# Patient Record
Sex: MALE | Race: WHITE | Employment: UNEMPLOYED | URBAN - METROPOLITAN AREA
[De-identification: names, ages, dates, MRNs, and addresses within clinical notes are randomized per-mention and may not be internally consistent; named-entity substitution may affect disease eponyms.]

---
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
## 2023-06-05 ENCOUNTER — HOSPITAL ENCOUNTER (INPATIENT)
Age: 23
LOS: 4 days | Discharge: OTHER INSTITUTION | End: 2023-06-09
Attending: EMERGENCY MEDICINE | Admitting: PSYCHIATRY & NEUROLOGY
Payer: MEDICAID

## 2023-06-05 DIAGNOSIS — Z00.8 ENCOUNTER FOR PSYCHOLOGICAL EVALUATION: Primary | ICD-10-CM

## 2023-06-05 DIAGNOSIS — R45.850 HOMICIDAL IDEATION: ICD-10-CM

## 2023-06-05 DIAGNOSIS — R45.851 SUICIDAL IDEATIONS: ICD-10-CM

## 2023-06-05 PROBLEM — F25.9 SCHIZOAFFECTIVE DISORDER (HCC): Status: ACTIVE | Noted: 2023-06-05

## 2023-06-05 LAB
ALBUMIN SERPL-MCNC: 5.1 G/DL (ref 3.5–5.2)
ALP SERPL-CCNC: 106 U/L (ref 40–129)
ALT SERPL-CCNC: 12 U/L (ref 0–40)
AMORPH SED URNS QL MICRO: PRESENT
AMPHET UR QL SCN: NOT DETECTED
ANION GAP SERPL CALCULATED.3IONS-SCNC: 11 MMOL/L (ref 7–16)
APAP SERPL-MCNC: <5 MCG/ML (ref 10–30)
AST SERPL-CCNC: 18 U/L (ref 0–39)
BACTERIA URNS QL MICRO: ABNORMAL /HPF
BARBITURATES UR QL SCN: NOT DETECTED
BASOPHILS # BLD: 0.07 E9/L (ref 0–0.2)
BASOPHILS NFR BLD: 0.7 % (ref 0–2)
BENZODIAZ UR QL SCN: NOT DETECTED
BILIRUB SERPL-MCNC: 0.2 MG/DL (ref 0–1.2)
BILIRUB UR QL STRIP: NEGATIVE
BUN SERPL-MCNC: 15 MG/DL (ref 6–20)
CALCIUM SERPL-MCNC: 10.5 MG/DL (ref 8.6–10.2)
CANNABINOIDS UR QL SCN: POSITIVE
CHLORIDE SERPL-SCNC: 102 MMOL/L (ref 98–107)
CLARITY UR: ABNORMAL
CO2 SERPL-SCNC: 26 MMOL/L (ref 22–29)
COCAINE UR QL SCN: NOT DETECTED
COLOR UR: YELLOW
CREAT SERPL-MCNC: 0.9 MG/DL (ref 0.7–1.2)
DRUG SCREEN COMMENT UR-IMP: ABNORMAL
EOSINOPHIL # BLD: 0.14 E9/L (ref 0.05–0.5)
EOSINOPHIL NFR BLD: 1.4 % (ref 0–6)
ERYTHROCYTE [DISTWIDTH] IN BLOOD BY AUTOMATED COUNT: 12.1 FL (ref 11.5–15)
ETHANOLAMINE SERPL-MCNC: <10 MG/DL (ref 0–0.08)
FENTANYL SCREEN, URINE: NOT DETECTED
GLUCOSE SERPL-MCNC: 77 MG/DL (ref 74–99)
GLUCOSE UR STRIP-MCNC: NEGATIVE MG/DL
HCT VFR BLD AUTO: 39.1 % (ref 37–54)
HGB BLD-MCNC: 13.1 G/DL (ref 12.5–16.5)
HGB UR QL STRIP: NEGATIVE
IMM GRANULOCYTES # BLD: 0.04 E9/L
IMM GRANULOCYTES NFR BLD: 0.4 % (ref 0–5)
KETONES UR STRIP-MCNC: NEGATIVE MG/DL
LEUKOCYTE ESTERASE UR QL STRIP: NEGATIVE
LYMPHOCYTES # BLD: 2.09 E9/L (ref 1.5–4)
LYMPHOCYTES NFR BLD: 20.2 % (ref 20–42)
MCH RBC QN AUTO: 30.1 PG (ref 26–35)
MCHC RBC AUTO-ENTMCNC: 33.5 % (ref 32–34.5)
MCV RBC AUTO: 89.9 FL (ref 80–99.9)
METHADONE UR QL SCN: NOT DETECTED
MONOCYTES # BLD: 0.65 E9/L (ref 0.1–0.95)
MONOCYTES NFR BLD: 6.3 % (ref 2–12)
NEUTROPHILS # BLD: 7.38 E9/L (ref 1.8–7.3)
NEUTS SEG NFR BLD: 71 % (ref 43–80)
NITRITE UR QL STRIP: NEGATIVE
OPIATES UR QL SCN: NOT DETECTED
OXYCODONE URINE: NOT DETECTED
PCP UR QL SCN: NOT DETECTED
PH UR STRIP: 7.5 [PH] (ref 5–9)
PLATELET # BLD AUTO: 299 E9/L (ref 130–450)
PMV BLD AUTO: 11 FL (ref 7–12)
POTASSIUM SERPL-SCNC: 4.1 MMOL/L (ref 3.5–5)
PROT SERPL-MCNC: 7.6 G/DL (ref 6.4–8.3)
PROT UR STRIP-MCNC: NEGATIVE MG/DL
RBC # BLD AUTO: 4.35 E12/L (ref 3.8–5.8)
RBC #/AREA URNS HPF: ABNORMAL /HPF (ref 0–2)
SALICYLATES SERPL-MCNC: <0.3 MG/DL (ref 0–30)
SODIUM SERPL-SCNC: 139 MMOL/L (ref 132–146)
SP GR UR STRIP: 1.01 (ref 1–1.03)
TRICYCLIC ANTIDEPRESSANTS SCREEN SERUM: NEGATIVE NG/ML
UROBILINOGEN UR STRIP-ACNC: 0.2 E.U./DL
WBC # BLD: 10.4 E9/L (ref 4.5–11.5)
WBC #/AREA URNS HPF: ABNORMAL /HPF (ref 0–5)

## 2023-06-05 PROCEDURE — 6370000000 HC RX 637 (ALT 250 FOR IP): Performed by: EMERGENCY MEDICINE

## 2023-06-05 PROCEDURE — 80053 COMPREHEN METABOLIC PANEL: CPT

## 2023-06-05 PROCEDURE — 80143 DRUG ASSAY ACETAMINOPHEN: CPT

## 2023-06-05 PROCEDURE — 81001 URINALYSIS AUTO W/SCOPE: CPT

## 2023-06-05 PROCEDURE — 85025 COMPLETE CBC W/AUTO DIFF WBC: CPT

## 2023-06-05 PROCEDURE — 82077 ASSAY SPEC XCP UR&BREATH IA: CPT

## 2023-06-05 PROCEDURE — 36415 COLL VENOUS BLD VENIPUNCTURE: CPT

## 2023-06-05 PROCEDURE — 80179 DRUG ASSAY SALICYLATE: CPT

## 2023-06-05 PROCEDURE — 80307 DRUG TEST PRSMV CHEM ANLYZR: CPT

## 2023-06-05 PROCEDURE — 99285 EMERGENCY DEPT VISIT HI MDM: CPT

## 2023-06-05 PROCEDURE — 1240000000 HC EMOTIONAL WELLNESS R&B

## 2023-06-05 RX ORDER — NICOTINE 21 MG/24HR
1 PATCH, TRANSDERMAL 24 HOURS TRANSDERMAL ONCE
Status: COMPLETED | OUTPATIENT
Start: 2023-06-05 | End: 2023-06-06

## 2023-06-05 ASSESSMENT — ENCOUNTER SYMPTOMS
DIARRHEA: 0
ABDOMINAL PAIN: 0
EYE DISCHARGE: 0
SORE THROAT: 0
COUGH: 0
EYE PAIN: 0
NAUSEA: 0
BACK PAIN: 0
SINUS PRESSURE: 0
WHEEZING: 0
VOMITING: 0
EYE REDNESS: 0
SHORTNESS OF BREATH: 0

## 2023-06-05 ASSESSMENT — LIFESTYLE VARIABLES
HOW OFTEN DO YOU HAVE A DRINK CONTAINING ALCOHOL: NEVER
HOW MANY STANDARD DRINKS CONTAINING ALCOHOL DO YOU HAVE ON A TYPICAL DAY: PATIENT DOES NOT DRINK

## 2023-06-05 ASSESSMENT — PAIN - FUNCTIONAL ASSESSMENT: PAIN_FUNCTIONAL_ASSESSMENT: NONE - DENIES PAIN

## 2023-06-05 NOTE — ED PROVIDER NOTES
There is no guarding or rebound. Musculoskeletal:         General: No tenderness or deformity. Cervical back: Normal range of motion and neck supple. Skin:     General: Skin is warm and dry. Neurological:      General: No focal deficit present. Mental Status: He is alert and oriented to person, place, and time. Psychiatric:         Mood and Affect: Mood normal.         Thought Content: Thought content normal.        Procedures        Diagnostic results    LABS:    Labs Reviewed   CBC WITH AUTO DIFFERENTIAL - Abnormal; Notable for the following components:       Result Value    Neutrophils Absolute 7.38 (*)     All other components within normal limits   COMPREHENSIVE METABOLIC PANEL - Abnormal; Notable for the following components:    Calcium 10.5 (*)     All other components within normal limits   SERUM DRUG SCREEN - Abnormal; Notable for the following components:    Acetaminophen Level <5.0 (*)     All other components within normal limits   URINALYSIS WITH MICROSCOPIC - Abnormal; Notable for the following components:    Clarity, UA CLOUDY (*)     Bacteria, UA FEW (*)     All other components within normal limits   URINE DRUG SCREEN - Abnormal; Notable for the following components:    Cannabinoid Scrn, Ur POSITIVE (*)     All other components within normal limits       As interpreted by me, the above displayed labs are abnormal. All other labs obtained during this visit were within normal range or not returned as of this dictation. EKG Interpretation  Interpreted by emergency department physician, Josef Cordero MD          RADIOLOGY:   Non-plain film images such as CT, Ultrasound and MRI are read by the radiologist. Plain radiographic images are visualized and preliminarily interpreted by the ED Provider with the below findings:    Interpretation per the Radiologist below, if available at the time of this note:    No orders to display     No results found.     No results

## 2023-06-06 PROBLEM — F12.10 CANNABIS ABUSE: Status: ACTIVE | Noted: 2023-06-06

## 2023-06-06 PROBLEM — F31.2 SEVERE MANIC BIPOLAR 1 DISORDER WITH PSYCHOTIC BEHAVIOR (HCC): Status: ACTIVE | Noted: 2023-06-06

## 2023-06-06 PROBLEM — F79 INTELLECTUAL DISABILITY: Status: ACTIVE | Noted: 2023-06-06

## 2023-06-06 PROBLEM — F25.9 SCHIZOAFFECTIVE DISORDER (HCC): Status: RESOLVED | Noted: 2023-06-05 | Resolved: 2023-06-06

## 2023-06-06 PROBLEM — F60.89 CLUSTER B PERSONALITY DISORDER (HCC): Status: ACTIVE | Noted: 2023-06-06

## 2023-06-06 PROCEDURE — 1240000000 HC EMOTIONAL WELLNESS R&B

## 2023-06-06 PROCEDURE — 6370000000 HC RX 637 (ALT 250 FOR IP): Performed by: NURSE PRACTITIONER

## 2023-06-06 RX ORDER — HALOPERIDOL 5 MG/ML
5 INJECTION INTRAMUSCULAR EVERY 6 HOURS PRN
Status: DISCONTINUED | OUTPATIENT
Start: 2023-06-06 | End: 2023-06-09 | Stop reason: HOSPADM

## 2023-06-06 RX ORDER — MAGNESIUM HYDROXIDE/ALUMINUM HYDROXICE/SIMETHICONE 120; 1200; 1200 MG/30ML; MG/30ML; MG/30ML
30 SUSPENSION ORAL PRN
Status: DISCONTINUED | OUTPATIENT
Start: 2023-06-06 | End: 2023-06-09 | Stop reason: HOSPADM

## 2023-06-06 RX ORDER — CHLORDIAZEPOXIDE HYDROCHLORIDE 25 MG/1
25 CAPSULE, GELATIN COATED ORAL EVERY 6 HOURS PRN
Status: DISCONTINUED | OUTPATIENT
Start: 2023-06-06 | End: 2023-06-09 | Stop reason: HOSPADM

## 2023-06-06 RX ORDER — NICOTINE 21 MG/24HR
1 PATCH, TRANSDERMAL 24 HOURS TRANSDERMAL DAILY
Status: DISCONTINUED | OUTPATIENT
Start: 2023-06-06 | End: 2023-06-09 | Stop reason: HOSPADM

## 2023-06-06 RX ORDER — ACETAMINOPHEN 325 MG/1
650 TABLET ORAL EVERY 6 HOURS PRN
Status: DISCONTINUED | OUTPATIENT
Start: 2023-06-06 | End: 2023-06-09 | Stop reason: HOSPADM

## 2023-06-06 RX ORDER — RISPERIDONE 1 MG/1
1 TABLET ORAL 2 TIMES DAILY
Status: DISCONTINUED | OUTPATIENT
Start: 2023-06-06 | End: 2023-06-08

## 2023-06-06 RX ORDER — HYDROXYZINE PAMOATE 25 MG/1
50 CAPSULE ORAL 3 TIMES DAILY PRN
Status: DISCONTINUED | OUTPATIENT
Start: 2023-06-06 | End: 2023-06-09 | Stop reason: HOSPADM

## 2023-06-06 RX ORDER — HALOPERIDOL 5 MG/1
5 TABLET ORAL EVERY 6 HOURS PRN
Status: DISCONTINUED | OUTPATIENT
Start: 2023-06-06 | End: 2023-06-09 | Stop reason: HOSPADM

## 2023-06-06 RX ORDER — DIVALPROEX SODIUM 250 MG/1
250 TABLET, DELAYED RELEASE ORAL EVERY 12 HOURS SCHEDULED
Status: DISCONTINUED | OUTPATIENT
Start: 2023-06-06 | End: 2023-06-09 | Stop reason: HOSPADM

## 2023-06-06 RX ORDER — LANOLIN ALCOHOL/MO/W.PET/CERES
3 CREAM (GRAM) TOPICAL NIGHTLY
Status: DISCONTINUED | OUTPATIENT
Start: 2023-06-06 | End: 2023-06-09 | Stop reason: HOSPADM

## 2023-06-06 RX ADMIN — RISPERIDONE 1 MG: 1 TABLET ORAL at 14:12

## 2023-06-06 RX ADMIN — DIVALPROEX SODIUM 250 MG: 250 TABLET, DELAYED RELEASE ORAL at 21:49

## 2023-06-06 RX ADMIN — RISPERIDONE 1 MG: 1 TABLET ORAL at 21:50

## 2023-06-06 RX ADMIN — MELATONIN 3 MG ORAL TABLET 3 MG: 3 TABLET ORAL at 21:50

## 2023-06-06 ASSESSMENT — SLEEP AND FATIGUE QUESTIONNAIRES
DO YOU USE A SLEEP AID: YES
AVERAGE NUMBER OF SLEEP HOURS: 7
AVERAGE NUMBER OF SLEEP HOURS: 8
SLEEP PATTERN: DIFFICULTY FALLING ASLEEP;DISTURBED/INTERRUPTED SLEEP
DO YOU HAVE DIFFICULTY SLEEPING: COMMENT
SLEEP PATTERN: DIFFICULTY FALLING ASLEEP;DISTURBED/INTERRUPTED SLEEP
DO YOU USE A SLEEP AID: YES
DO YOU HAVE DIFFICULTY SLEEPING: YES

## 2023-06-06 ASSESSMENT — PATIENT HEALTH QUESTIONNAIRE - PHQ9: SUM OF ALL RESPONSES TO PHQ QUESTIONS 1-9: 9

## 2023-06-06 ASSESSMENT — LIFESTYLE VARIABLES
HOW OFTEN DO YOU HAVE A DRINK CONTAINING ALCOHOL: NEVER
HOW MANY STANDARD DRINKS CONTAINING ALCOHOL DO YOU HAVE ON A TYPICAL DAY: PATIENT DOES NOT DRINK
HOW MANY STANDARD DRINKS CONTAINING ALCOHOL DO YOU HAVE ON A TYPICAL DAY: PATIENT DOES NOT DRINK
HOW OFTEN DO YOU HAVE A DRINK CONTAINING ALCOHOL: NEVER

## 2023-06-06 ASSESSMENT — PAIN SCALES - GENERAL: PAINLEVEL_OUTOF10: 0

## 2023-06-06 NOTE — H&P
Department of Psychiatry  History and Physical - Adult           Patient personally seen and examined by me and mental status exam performed. I agree the below assessment by the medical student. Psychomotor evaluation revealed no agitation retardation any abnormal movements. His eye contact is fair his speech is pressured and rapid. His mood is \"I feel okay. \"  Affect is a little did. His thought process is linear without flight of ideas loose associations. He admits to auditory and visual hallucinations states he sees shadows states hears voices \"all the time. \"  States the voices are inside of his head. He denies suicidal homicidal ideations intent or plan. He is able to repeat words and phrases, his vocabulary is intact he has knowledge of current events and past events recent remote memory are intact   impulse control is adequate cognitive function peers to be his baseline his insight judgment is fair he is alert oriented time place and person        CHIEF COMPLAINT:  \" I want to get my life together\"    Patient was seen after discussing with the treatment team and reviewing the chart    CIRCUMSTANCES OF ADMISSION:   Patient stated he was suicidal and homicidal, was pink slipped. HISTORY OF PRESENT ILLNESS:      The patient is a 25 y.o. unemployed, single, homeless, high school drop out, with no children male with significant past history of Schizophrenia, Opositional Defiant Disorder, ADHD with most recent treatment back in 2021 with Respirodol, Focalin and Prozac with several pervious psych hospitalizations at [address] in [address] [place] [address] 7 in [place]. He presented to the ED due to increased SI and HI. He was stabilized medically and admitted to  for further stabilization and evaluation. UDS was positive for marijuana and QTc is 385. Patient states that his mood is okay today. He states that he has not been in a good mindset recently.  Stating that he got kicked out of the trailer
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
## 2023-06-06 NOTE — ED NOTES
Nurse to nurse given to [facility] on [facility].      Vinh Bansal RN  06/06/23 3814
Nurse to nurse report given to [facility] RN.      Katarzyna Carmona, JONATAN  06/05/23 9969
Patient has been accepted for admission to [facility] by Laura Chowdhury NP under Dr. Nathanael Willoughby. Patient will go to room 7520B. Called admitting and notified [facility]. NUNU RN is aware to call nurse to nurse and put in for patient transport.      URSULA Ayala, [place]  06/05/23 1459
Patient has been talkative and dramatic.      Maye Davison RN  06/05/23 2304
Presented patient to [facility], NP, accepted to be admitted to [address] [place] [address].      Angi Self, RN  06/05/23 5726
Pt has a pillow case full of clothes, tote bag in locker 27 and another 2 bags in locker 26.
happened? 2. Have you ever had an order of protection taken out against you? []  Yes [x]  No  3. Have you ever been arrested due to violence? []  Yes [x]  No  4. Have you ever been cruel to animals? []  Yes [x]  No    After consideration of C-SSRS screening results, C-SSRS assessments, and this professional's assessment the patient's overall suicide risk assessed to be:  [] No Risk  [x] Low   [] Moderate   [] High     [x] Discussed current suicide risk, protective and risk factors with RN and ED Physician. Consulted with ED Physician.  Disposition/level of care recommended at this time:  [] Home:   [] Outpatient Provider:   [] Crisis Unit:   [x] Inpatient Psychiatric Unit:  [] Other:                    Jacek Box  06/05/23 3268

## 2023-06-06 NOTE — GROUP NOTE
Group Therapy Note    Date: 6/6/2023    Group Start Time: 8759  Group End Time: 7685  Group Topic: Psychoeducation    [facility] AC[facility] BH 2    [place], [place]                                                                        Group Therapy Note    Date: 6/6/2023  Start Time: 6874  End Time:  1050  Number of Participants: 14    Type of Group: Psychoeducation    Wellness Binder Information  Module Name:  Take Time for Yourself    Patient's Goal:  ID ways to improve self-care through self-reflection. Encourage discussion amongst peers      Notes:  Patient actively engaged in discussion of self-care and self-reflection. Patient accepted handout on topic, and engaged in self-reflection. Status After Intervention:  Unchanged    Participation Level:  Active Listener and Interactive    Participation Quality: Appropriate, Attentive, Sharing, and Supportive      Speech:  normal      Thought Process/Content: Logical      Affective Functioning: Congruent      Mood: euthymic      Level of consciousness:  Alert and Attentive      Response to Learning: Able to verbalize current knowledge/experience and Able to verbalize/acknowledge new learning      Endings: None Reported    Modes of Intervention: Education      Discipline Responsible: Psychoeducational Specialist      Signature:  Radha [facility] [place]

## 2023-06-06 NOTE — CARE COORDINATION
SW contacted the [facility]  and was advised pt is not in their system so he is not on any restrictions.

## 2023-06-07 LAB
CHOLESTEROL, TOTAL: 136 MG/DL (ref 0–199)
HBA1C MFR BLD: 5.4 % (ref 4–5.6)
HDLC SERPL-MCNC: 56 MG/DL
LDLC SERPL CALC-MCNC: 68 MG/DL (ref 0–99)
TRIGL SERPL-MCNC: 59 MG/DL (ref 0–149)
VLDLC SERPL CALC-MCNC: 12 MG/DL

## 2023-06-07 PROCEDURE — 80061 LIPID PANEL: CPT

## 2023-06-07 PROCEDURE — 83036 HEMOGLOBIN GLYCOSYLATED A1C: CPT

## 2023-06-07 PROCEDURE — 6370000000 HC RX 637 (ALT 250 FOR IP): Performed by: NURSE PRACTITIONER

## 2023-06-07 PROCEDURE — 1240000000 HC EMOTIONAL WELLNESS R&B

## 2023-06-07 PROCEDURE — 36415 COLL VENOUS BLD VENIPUNCTURE: CPT

## 2023-06-07 RX ADMIN — RISPERIDONE 1 MG: 1 TABLET ORAL at 21:16

## 2023-06-07 RX ADMIN — RISPERIDONE 1 MG: 1 TABLET ORAL at 09:00

## 2023-06-07 RX ADMIN — MELATONIN 3 MG ORAL TABLET 3 MG: 3 TABLET ORAL at 21:17

## 2023-06-07 RX ADMIN — DIVALPROEX SODIUM 250 MG: 250 TABLET, DELAYED RELEASE ORAL at 21:17

## 2023-06-07 RX ADMIN — DIVALPROEX SODIUM 250 MG: 250 TABLET, DELAYED RELEASE ORAL at 09:00

## 2023-06-07 ASSESSMENT — PAIN SCALES - GENERAL: PAINLEVEL_OUTOF10: 0

## 2023-06-07 NOTE — GROUP NOTE
Group Therapy Note    Date: 6/7/2023    Group Start Time: 7967  Group End Time: 1055  Group Topic: Psychoeducation    [facility] AC[facility]  [address] [place], [place]                                                                        Group Therapy Note    Date: 6/7/2023    Type of Group: Psychoeducation    Wellness Binder Information  Module Name:  self esteem      Patient's Goal:  Patient will be able to id what he or she can do to increase their own positive thoughts and feelings thru the day. Notes:  pleasant and sharing in group. Able to to participate and be supportive to others. Status After Intervention:  Improved    Participation Level:  Active Listener and Interactive    Participation Quality: Appropriate, Attentive, Sharing, and Supportive      Speech:  normal      Thought Process/Content: Logical      Affective Functioning: Congruent      Mood: euthymic      Level of consciousness:  Alert, Oriented x4, and Attentive      Response to Learning: Able to verbalize/acknowledge new learning, Able to retain information, and Progressing to goal      Endings: None Reported    Modes of Intervention: Education, Support, Socialization, Exploration, and Problem-solving      Discipline Responsible: Psychoeducational Specialist      Signature:  Caitlyn Tellez

## 2023-06-07 NOTE — GROUP NOTE
Group Therapy Note    Date: 6/7/2023    Group Start Time: 1100  Group End Time: 1140  Group Topic: Psychotherapy    [facility]  [address], MSW, W        Group Therapy Note    Attendees: 7       Patient's Goal:  To increase socialization and improve interpersonal relationships. Notes:  Pt was an active participant in group discussion. Status After Intervention:  Improved    Participation Level: Active Listener and Interactive    Participation Quality: Appropriate, Attentive, Sharing, and Supportive      Speech:  normal      Thought Process/Content: Logical  Linear      Affective Functioning: Congruent      Mood: depressed      Level of consciousness:  Alert, Oriented x4, and Attentive      Response to Learning: Able to verbalize current knowledge/experience, Able to verbalize/acknowledge new learning, Able to retain information, Capable of insight, and Progressing to goal      Endings: None Reported    Modes of Intervention: Support, Socialization, and Exploration      Discipline Responsible: /Counselor      Signature:   URSULA Luther, [facility]

## 2023-06-07 NOTE — CARE COORDINATION
Pt was seen during treatment team. Pt reports feeling alright today, denied SI/HI/AVH. Pt stated the medications are ok and he is feeling a little better. Pt will go to the [facility] at time of discharge. Pt cooperative, pleasant, with good eye contact, clear speech, improving insight/judgement.

## 2023-06-08 PROCEDURE — 6370000000 HC RX 637 (ALT 250 FOR IP): Performed by: NURSE PRACTITIONER

## 2023-06-08 PROCEDURE — 1240000000 HC EMOTIONAL WELLNESS R&B

## 2023-06-08 RX ORDER — RISPERIDONE 1 MG/1
1 TABLET ORAL DAILY
Status: DISCONTINUED | OUTPATIENT
Start: 2023-06-09 | End: 2023-06-09 | Stop reason: HOSPADM

## 2023-06-08 RX ORDER — RISPERIDONE 2 MG/1
2 TABLET, ORALLY DISINTEGRATING ORAL NIGHTLY
Status: DISCONTINUED | OUTPATIENT
Start: 2023-06-08 | End: 2023-06-09

## 2023-06-08 RX ADMIN — RISPERIDONE 1 MG: 1 TABLET ORAL at 09:14

## 2023-06-08 RX ADMIN — MELATONIN 3 MG ORAL TABLET 3 MG: 3 TABLET ORAL at 21:03

## 2023-06-08 RX ADMIN — DIVALPROEX SODIUM 250 MG: 250 TABLET, DELAYED RELEASE ORAL at 09:14

## 2023-06-08 RX ADMIN — DIVALPROEX SODIUM 250 MG: 250 TABLET, DELAYED RELEASE ORAL at 21:03

## 2023-06-08 RX ADMIN — RISPERIDONE 2 MG: 2 TABLET, ORALLY DISINTEGRATING ORAL at 21:03

## 2023-06-08 ASSESSMENT — PAIN SCALES - GENERAL: PAINLEVEL_OUTOF10: 0

## 2023-06-08 NOTE — GROUP NOTE
Group Therapy Note    Date: 6/8/2023    Group Start Time: 1100  Group End Time: 1140  Group Topic: Psychotherapy    [facility]  [address], MSW, LSW        Group Therapy Note    Attendees: 6       Patient's Goal:  To increase socialization and improve interpersonal relationships. Notes:  Pt was an active participant in group discussion. Status After Intervention:  Improved    Participation Level: Active Listener, Interactive, and Monopolizing    Participation Quality: Appropriate, Attentive, Sharing, and Supportive      Speech:  normal      Thought Process/Content: Logical  Linear      Affective Functioning: Congruent      Mood: euthymic      Level of consciousness:  Alert, Oriented x4, and Attentive      Response to Learning: Able to verbalize current knowledge/experience, Able to verbalize/acknowledge new learning, Able to retain information, Capable of insight, and Progressing to goal      Endings: None Reported    Modes of Intervention: Support, Socialization, and Exploration      Discipline Responsible: /Counselor      Signature:   URSULA Yung, [place]

## 2023-06-08 NOTE — GROUP NOTE
Group Therapy Note    Date: 6/8/2023    Group Start Time: 1625  Group End Time: 5164  Group Topic: Cognitive Skills    [facility]    Thankful URSULA Nuno, DIANNA        Group Therapy Note    Attendees: 10       Patient's Goal:  Pt attended group therapy where we discussed mindfulness meditation and the benefits associated with it. Notes:  Pt was an active participant in group therapy. Status After Intervention:  Improved    Participation Level: Active Listener and Interactive    Participation Quality: Appropriate, Attentive, and Sharing      Speech:  normal      Thought Process/Content: Logical      Affective Functioning: Congruent      Mood: euthymic      Level of consciousness:  Alert and Attentive      Response to Learning: Able to verbalize current knowledge/experience, Able to verbalize/acknowledge new learning, and Able to retain information      Endings: None Reported    Modes of Intervention: Education, Support, Socialization, Exploration, Clarifying, and Problem-solving      Discipline Responsible: /Counselor      Signature:   URSULA Vela, DIANNA

## 2023-06-08 NOTE — GROUP NOTE
Group Therapy Note    Date: 6/8/2023    Group Start Time: 0945  Group End Time: 6320  Group Topic: Cognitive Skills    [facility]    Thankful URSULA Nuno, DIANNA        Group Therapy Note    Attendees: 13       Patient's Goal:  Pt attended group therapy where we had a special guest, Claudy Velazquez, come and talk about his journey of recovery and play the guitar. Notes:  Pt was an active listener in group therapy. Pt was also was able to identify a daily goal.    Status After Intervention:  Improved    Participation Level: Active Listener and Interactive    Participation Quality: Appropriate and Attentive      Speech:  normal      Thought Process/Content: Logical      Affective Functioning: Congruent      Mood: euthymic      Level of consciousness:  Alert, Oriented x4, and Attentive      Response to Learning: Able to verbalize current knowledge/experience, Able to verbalize/acknowledge new learning, and Able to retain information      Endings: None Reported    Modes of Intervention: Education, Support, Socialization, Exploration, Clarifying, and Problem-solving      Discipline Responsible: /Counselor      Signature:   URSULA Rausch, DIANNA

## 2023-06-09 VITALS
RESPIRATION RATE: 16 BRPM | TEMPERATURE: 96.8 F | SYSTOLIC BLOOD PRESSURE: 134 MMHG | HEART RATE: 74 BPM | HEIGHT: 68 IN | WEIGHT: 135 LBS | DIASTOLIC BLOOD PRESSURE: 94 MMHG | OXYGEN SATURATION: 100 % | BODY MASS INDEX: 20.46 KG/M2

## 2023-06-09 LAB
EKG ATRIAL RATE: 54 BPM
EKG P AXIS: -29 DEGREES
EKG P-R INTERVAL: 108 MS
EKG Q-T INTERVAL: 406 MS
EKG QRS DURATION: 92 MS
EKG QTC CALCULATION (BAZETT): 385 MS
EKG R AXIS: 82 DEGREES
EKG T AXIS: 61 DEGREES
EKG VENTRICULAR RATE: 54 BPM

## 2023-06-09 PROCEDURE — 6370000000 HC RX 637 (ALT 250 FOR IP): Performed by: NURSE PRACTITIONER

## 2023-06-09 RX ORDER — RISPERIDONE 2 MG/1
2 TABLET ORAL NIGHTLY
Status: DISCONTINUED | OUTPATIENT
Start: 2023-06-09 | End: 2023-06-09 | Stop reason: HOSPADM

## 2023-06-09 RX ORDER — LANOLIN ALCOHOL/MO/W.PET/CERES
3 CREAM (GRAM) TOPICAL NIGHTLY
Qty: 30 TABLET | Refills: 0 | Status: SHIPPED | OUTPATIENT
Start: 2023-06-09 | End: 2023-07-09

## 2023-06-09 RX ORDER — RISPERIDONE 2 MG/1
2 TABLET ORAL NIGHTLY
Qty: 30 TABLET | Refills: 0 | Status: SHIPPED | OUTPATIENT
Start: 2023-06-09 | End: 2023-07-09

## 2023-06-09 RX ORDER — RISPERIDONE 1 MG/1
1 TABLET ORAL DAILY
Qty: 30 TABLET | Refills: 0 | Status: SHIPPED | OUTPATIENT
Start: 2023-06-10 | End: 2023-07-10

## 2023-06-09 RX ORDER — NICOTINE 21 MG/24HR
1 PATCH, TRANSDERMAL 24 HOURS TRANSDERMAL DAILY
Qty: 30 PATCH | Refills: 0
Start: 2023-06-10 | End: 2023-07-10

## 2023-06-09 RX ADMIN — DIVALPROEX SODIUM 250 MG: 250 TABLET, DELAYED RELEASE ORAL at 08:35

## 2023-06-09 RX ADMIN — RISPERIDONE 1 MG: 1 TABLET ORAL at 08:35

## 2023-06-09 ASSESSMENT — PAIN SCALES - GENERAL
PAINLEVEL_OUTOF10: 0
PAINLEVEL_OUTOF10: 0

## 2023-06-09 NOTE — BH NOTE
4 Eyes Skin Assessment     NAME:  Xavier Cantu  YOB: 2000  MEDICAL RECORD NUMBER:  [id number]    The patient is being assessed for  {Reason for Assessment:25322}    I agree that at least one RN has performed a thorough Head to Toe Skin Assessment on the patient. ALL assessment sites listed below have been assessed. Areas assessed by both nurses:    Head, Face, Ears, Shoulders, Back, Chest, Arms, Elbows, Hands, Sacrum. Buttock, Coccyx, Ischium, Legs. Feet and Heels, and Under Medical Devices         Does the Patient have a Wound?  No noted wound(s)       Kael Prevention initiated by RN: Yes  Wound Care Orders initiated by RN: No    Pressure Injury (Stage 3,4, Unstageable, DTI, NWPT, and Complex wounds) if present, place Wound referral order by RN under : No    New Ostomies, if present place, Ostomy referral order under : No     Nurse 1 eSignature: Electronically signed by Alice Nunez RN on 6/6/23 at 5:01 AM EDT    **SHARE this note so that the co-signing nurse can place an eSignature**    Nurse 2 eSignature: Electronically signed by Zan Baker RN on 6/6/23 at 5:20 AM EDT
Pt is out on the unit and is social with peers. He is pleasant and cooperative during interaction. He states that he has been feeling better today except for the last hour in which he states that he has been having intrusive thoughts of something happening to his daughter. He states that he saw her grown up, in a car and getting hit by a truck. He states that it is very upsetting because he feels helpless. He denies any depression, harmful ideations and hallucinations. He is voicing that he wants to get his life back in order so that he can get custody back of his daughter.
Pt resting in room with eyes closed. Q15min checks continue.
Pt states that he took:    Risperidol  Focalin  Prozac    Pt states he doesn't know the dose and that he hasn't been on his meds since 2021 and his Mom got them for him. Pt isn't sure about the pharmacy, but thinks it was [facility].
about quitting (benefits of quitting, techniques in how to quit, available resources  ( ) Referral for counseling faxed to [facility]                                                                                                                   ( X) Patient refused counseling  ( ) Patient has not smoked in the last 30 days    Metabolic Screening:    No results found for: LABA1C    No results found for: CHOL  No results found for: TRIG  No results found for: HDL  No components found for: LDLCAL  No results found for: LABVLDL      Body mass index is 20.53 kg/m².     BP Readings from Last 2 Encounters:   06/06/23 (!) 132/58   10/14/20 118/76           Pt admitted with followings belongings:       Maria L Velasco RN

## 2023-06-09 NOTE — PLAN OF CARE
[facility]  Initial Interdisciplinary Treatment Plan NOTE    Review Date & Time:  6/6/23 1000    Patient was in treatment team    Admission Type:   Admission Type: Involuntary    Reason for admission:  Reason for Admission: \"I want to get my life together. I want to become successful and start a new life in a new city. \"      Estimated Length of Stay Update:   5 DAYS  Estimated Discharge Date Update:  FRIDAY    EDUCATION:   Learner Progress Toward Treatment Goals: Reviewed results and recommendations of this team    Method: Small group    Outcome: Verbalized understanding and Needs reinforcement    PATIENT GOALS: \"MEET MY STAFF\"    PLAN/TREATMENT RECOMMENDATIONS UPDATE: SUICIDE RISK ASSESSMENTS, HOMICIDAL RISK ASSESSMENTS, SUPPORTIVE CARE, MEDICATIONS AND GROUPS, DISPOSITION ASSIST AND FOLLOW UP     GOALS UPDATE:   Time frame for Short-Term Goals:  5 DAYS    Mandi Glover RN
Patient is alert and oriented x 4. Denies pain. No noted signs or symptoms of distress. Denies SI/HI, A/V/H, or thoughts of self harm when asked. Medication compliant. Pleasant, polite, and cooperative. Brightened Affect. Appropriate with peers and staff. Appears well groomed/neat, room Is clean. Up for breakfast.  Has c/o anxiety related to a dream he had last night. Will continue to monitor for safety q 15 minute safety rounds and environmental assessments. Problem: Anxiety  Goal: Will report anxiety at manageable levels  Description: INTERVENTIONS:  1. Administer medication as ordered  2. Teach and rehearse alternative coping skills  3.  Provide emotional support with 1:1 interaction with staff  6/9/2023 0950 by Jd Gallagher RN  Outcome: Not Progressing  6/9/2023 0520 by Sohail Singletary RN  Outcome: Progressing  6/8/2023 2101 by Damaris Nuñez RN  Outcome: Progressing
Problem: Risk for Elopement  Goal: Patient will not exit the unit/facility without proper excort  6/6/2023 2138 by Ruslan Brunson RN  Outcome: Progressing  6/6/2023 1031 by Barrera Madden RN  Outcome: Progressing     Problem: Anxiety  Goal: Will report anxiety at manageable levels  Description: INTERVENTIONS:  1. Administer medication as ordered  2. Teach and rehearse alternative coping skills  3. Provide emotional support with 1:1 interaction with staff  6/6/2023 2138 by Ruslan Brunson RN  Outcome: Progressing  6/6/2023 1031 by Barrera Madden RN  Outcome: Progressing     Problem: Coping  Goal: Pt/Family able to verbalize concerns and demonstrate effective coping strategies  Description: INTERVENTIONS:  1. Assist patient/family to identify coping skills, available support systems and cultural and spiritual values  2. Provide emotional support, including active listening and acknowledgement of concerns of patient and caregivers  3. Reduce environmental stimuli, as able  4. Instruct patient/family in relaxation techniques, as appropriate  5. Assess for spiritual pain/suffering and initiate Spiritual Care, Psychosocial Clinical Specialist consults as needed  6/6/2023 2138 by Ruslan Brunson RN  Outcome: Progressing  6/6/2023 1031 by Barrera Madden RN  Outcome: Progressing     Pt denies SI, HI and AVH. Pt out on the unit. Social with peer. Brightened. Pressured speech. Medication compliant. Attends group. Will continue to monitor.
Problem: Risk for Elopement  Goal: Patient will not exit the unit/facility without proper excort  6/7/2023 1042 by Amanda Salinas RN  Outcome: Progressing  6/6/2023 2138 by Morgan Mar RN  Outcome: Progressing     Problem: Anxiety  Goal: Will report anxiety at manageable levels  Description: INTERVENTIONS:  1. Administer medication as ordered  2. Teach and rehearse alternative coping skills  3. Provide emotional support with 1:1 interaction with staff  6/7/2023 1042 by Amanda Salinas RN  Outcome: Progressing  6/6/2023 2138 by Morgan Mar RN  Outcome: Progressing     Problem: Coping  Goal: Pt/Family able to verbalize concerns and demonstrate effective coping strategies  Description: INTERVENTIONS:  1. Assist patient/family to identify coping skills, available support systems and cultural and spiritual values  2. Provide emotional support, including active listening and acknowledgement of concerns of patient and caregivers  3. Reduce environmental stimuli, as able  4. Instruct patient/family in relaxation techniques, as appropriate  5. Assess for spiritual pain/suffering and initiate Spiritual Care, Psychosocial Clinical Specialist consults as needed  6/7/2023 1042 by Amanda Salinas RN  Outcome: Progressing  6/6/2023 2138 by Morgan Mar RN  Outcome: Progressing     Problem: Decision Making  Goal: Pt/Family able to effectively weigh alternatives and participate in decision making related to treatment and care  Description: INTERVENTIONS:  1. Determine when there are differences between patient's view, family's view, and healthcare provider's view of condition  2. Facilitate patient and family articulation of goals for care  3. Help patient and family identify pros/cons of alternative solutions  4. Provide information as requested by patient/family  5. Respect patient/family right to receive or not to receive information  6. Serve as a liaison between patient and family and health care team  7.  Initiate
Problem: Risk for Elopement  Goal: Patient will not exit the unit/facility without proper excort  6/8/2023 0032 by Cait Castillo RN  Outcome: Progressing  6/7/2023 1042 by Augie Terrell RN  Outcome: Progressing     Problem: Anxiety  Goal: Will report anxiety at manageable levels  Description: INTERVENTIONS:  1. Administer medication as ordered  2. Teach and rehearse alternative coping skills  3. Provide emotional support with 1:1 interaction with staff  6/8/2023 0032 by Cait Castillo RN  Outcome: Progressing  6/7/2023 1042 by Augie Terrell RN  Outcome: Progressing     Problem: Coping  Goal: Pt/Family able to verbalize concerns and demonstrate effective coping strategies  Description: INTERVENTIONS:  1. Assist patient/family to identify coping skills, available support systems and cultural and spiritual values  2. Provide emotional support, including active listening and acknowledgement of concerns of patient and caregivers  3. Reduce environmental stimuli, as able  4. Instruct patient/family in relaxation techniques, as appropriate  5.  Assess for spiritual pain/suffering and initiate Spiritual Care, Psychosocial Clinical Specialist consults as needed  6/8/2023 0032 by Cait Castillo RN  Outcome: Progressing  6/7/2023 1042 by Augie Terrell RN  Outcome: Progressing
Problem: Risk for Elopement  Goal: Patient will not exit the unit/facility without proper excort  6/8/2023 1050 by Johanne Greenberg RN  Outcome: Progressing  6/8/2023 0426 by Cb Blake RN  Outcome: Progressing  6/8/2023 0032 by Remy Anderson RN  Outcome: Progressing     Problem: Anxiety  Goal: Will report anxiety at manageable levels  Description: INTERVENTIONS:  1. Administer medication as ordered  2. Teach and rehearse alternative coping skills  3. Provide emotional support with 1:1 interaction with staff  6/8/2023 1050 by Johanne Greenberg RN  Outcome: Progressing  6/8/2023 0426 by Cb Blake RN  Outcome: Progressing  6/8/2023 0032 by Remy Anderson RN  Outcome: Progressing     Problem: Coping  Goal: Pt/Family able to verbalize concerns and demonstrate effective coping strategies  Description: INTERVENTIONS:  1. Assist patient/family to identify coping skills, available support systems and cultural and spiritual values  2. Provide emotional support, including active listening and acknowledgement of concerns of patient and caregivers  3. Reduce environmental stimuli, as able  4. Instruct patient/family in relaxation techniques, as appropriate  5. Assess for spiritual pain/suffering and initiate Spiritual Care, Psychosocial Clinical Specialist consults as needed  6/8/2023 0426 by Cb Blake RN  Outcome: Progressing  6/8/2023 0032 by Remy Anderson RN  Outcome: Progressing     Problem: Decision Making  Goal: Pt/Family able to effectively weigh alternatives and participate in decision making related to treatment and care  Description: INTERVENTIONS:  1. Determine when there are differences between patient's view, family's view, and healthcare provider's view of condition  2. Facilitate patient and family articulation of goals for care  3. Help patient and family identify pros/cons of alternative solutions  4. Provide information as requested by patient/family  5.  Respect patient/family right to receive or
Pt resting in bed apparently asleep with easy even respirations at HS q 15 min electronic rounding.
Pt resting in bed apparently asleep with easy even respirations at HS q 15 min electronic rounding.
from Ethics, Palliative Care or initiate [facility] as is appropriate  Outcome: Progressing     Problem: Behavior  Goal: Pt/Family maintain appropriate behavior and adhere to behavioral management agreement, if implemented  Description: INTERVENTIONS:  1. Assess patient/family's coping skills and  non-compliant behavior (including use of illegal substances)  2. Notify security of behavior or suspected illegal substances which indicate the need for search of the family and/or belongings  3. Encourage verbalization of thoughts and concerns in a socially appropriate manner  4. Utilize positive, consistent limit setting strategies supporting safety of patient, staff and others  5. Encourage participation in the decision making process about the behavioral management agreement  6. If a visitor's behavior poses a threat to safety call refer to organization policy.   7. Initiate consult with , Psychosocial CNS, Spiritual Care as appropriate  Outcome: Progressing
management agreement, if implemented  Description: INTERVENTIONS:  1. Assess patient/family's coping skills and  non-compliant behavior (including use of illegal substances)  2. Notify security of behavior or suspected illegal substances which indicate the need for search of the family and/or belongings  3. Encourage verbalization of thoughts and concerns in a socially appropriate manner  4. Utilize positive, consistent limit setting strategies supporting safety of patient, staff and others  5. Encourage participation in the decision making process about the behavioral management agreement  6. If a visitor's behavior poses a threat to safety call refer to organization policy.   7. Initiate consult with , Psychosocial CNS, Spiritual Care as appropriate  Outcome: Progressing

## 2023-06-09 NOTE — GROUP NOTE
Group Therapy Note    Date: 6/9/2023    Group Start Time: 1100  Group End Time: 1140  Group Topic: Psychotherapy    [facility]    URSULA Mc LSW        Group Therapy Note    Attendees: 8       Patient's Goal:  To increase socialization and improve interpersonal relationships. Notes:  Pt was an active participant in group discussion. Status After Intervention:  Improved    Participation Level:  Active Listener and Interactive    Participation Quality: Appropriate, Attentive, Sharing, and Supportive      Speech:  loud      Thought Process/Content: Logical      Affective Functioning: Congruent      Mood: anxious      Level of consciousness:  Alert and Oriented x4      Response to Learning: Able to verbalize current knowledge/experience      Endings: None Reported    Modes of Intervention: Education, Support, and Socialization      Discipline Responsible: /Counselor      Signature:  URSULA Hogan, DIANNA

## 2023-06-09 NOTE — GROUP NOTE
Group Therapy Note    Date: 6/9/2023    Group Start Time: 1010  Group End Time: 1050  Group Topic: Psychoeducation    [facility]    [place], [place]                                                                        Group Therapy Note    Date: 6/9/2023  Type of Group: Psychoeducation    Wellness Binder Information  Module Name:  coping skills for stress management    Patient's Goal:  Patient will be able to id different types of stress management skills to help manage daily stressors. Notes:  Pleasant and sharing in group, able to id 1-3 types of skills he our she would like to try. Accepting of handout. Status After Intervention:  Improved    Participation Level:  Active Listener and Interactive    Participation Quality: Appropriate, Attentive, and Sharing      Speech:  normal      Thought Process/Content: Logical      Affective Functioning: Congruent      Mood: euthymic      Level of consciousness:  Alert, Oriented x4, and Attentive      Response to Learning: Able to verbalize/acknowledge new learning, Able to retain information, and Progressing to goal      Endings: None Reported    Modes of Intervention: Education, Support, Socialization, Exploration, and Problem-solving      Discipline Responsible: Psychoeducational Specialist      Signature:  Shanna Jarrell

## 2023-06-09 NOTE — DISCHARGE SUMMARY
personality disorder will remain a static risk factor for this patient treatment team felt the patient obtain the maximum benefit from his hospitalization he was set up with an outpatient mental health agency for outpatient follow-up services. At the time of discharge patient did not show any impulsive behavior. He was up on the unit he was attending groups and socializing with peers. He vehemently denied any suicidal homicidal ideations intent or plan. He was eating well and sleeping well there are no neurovegetative signs or symptoms of depression he denied any auditory or visual hallucinations. There are no overt or covert signs of psychosis. He was appreciative of the help that he received here. This patient no longer meets criteria for inpatient hospitalization. No AVH or paranoid thoughts  No Hopeless or worthless feeling  No active SI/HI  Appetite:  [x] Normal  [] Increased  [] Decreased    Sleep:       [x] Normal  [] Fair       [] Poor            Energy:    [x] Normal  [] Increased  [] Decreased     SI [] Present  [x] Absent  HI  []Present  [x] Absent   Aggression:  [] yes  [x] no  Patient is [x] able  [] unable to CONTRACT FOR SAFETY   Medication side effects(SE):  [x] None(Psych. Meds.) [] Other      Mental Status Examination on discharge:    Level of consciousness:  within normal limits   Appearance:  well-appearing  Behavior/Motor:  no abnormalities noted  Attitude toward examiner:  attentive and good eye contact  Speech:  spontaneous, normal rate and normal volume   Mood: \"My mood is good. \"  Affect: Appropriate and pleasant  Thought processes: Linear without flight of ideas loose associations  Thought content: Devoid of any auditory or visual hallucinations delusions or any other perceptual abnormalities.   Denies SI/HI intent or plan  Cognition:  oriented to person, place, and time   Concentration intact  Memory intact  Insight good   Judgement fair   Fund of Knowledge

## 2023-06-09 NOTE — CARE COORDINATION
ANA was advised pt medications are $13, he does not have any money, and he does not have anyone that can help pay for them. SW supervisor approved a medication voucher. Voucher was handed to RN and faxed to the pharmacy.

## 2023-06-09 NOTE — CARE COORDINATION
Pt was seen during treatment team. Pt reports feeling alright today, denied SI/HI/AVH. Pt expressed feeling ready to discharge to the [facility] today. Pt plans to start working on doing what he needs to do to get custody of his daughter. Pt stated his parents are currently caring for his daughter. Pt will continue to treat with [facility] at time of discharge. Collateral was unable to be obtained due to pt stated he has no support or contact with his family. Pt has access to help-seeking behaviors, goals&hope for the future, new outpatient mental health provider, communication skills. Pt cooperative, bright, pleasant, with good eye contact, clear speech, improved insight/judgement. SW contacted [facility]  and confirmed pt can come to their facility today as long as he has his medications in hand.

## 2023-06-09 NOTE — PROGRESS NOTES
CLINICAL PHARMACY NOTE: MEDS TO BEDS    Total # of Prescriptions Filled: 3   The following medications were delivered to the patient:  Melatonin 3 mg  Risperidone 2 mg  Risperidone 1 mg    Additional Documentation:   Delivered to JONATAN tanner
Leisure assessment complete.
Patient attended afternoon recreation activity. Patients participated in trivia with others. Able to share and answer with others. Engaged in activity. Patient was 1 of 9 in attendance.
Patient attended community meeting   Was updated on expectations of the unit, staffing, and programming  Patient shared goal for today as \"meet my staff\"
Patient attended morning community meeting. Updated on staffing assignments and daily expectations. Shared goal for the day as to go with the flow.
Patient attended morning community meeting. Updated on staffing assignments and daily expectations. Shared goal for the day as to keep up with therapy when I am discharge.
Patient attended peer recovery group from [phone]  Patient calm and cooperative, and maintained attention. Patient was an active participant in discussion, and was 1 of 9 in attendance. Patient will continue to be provided with opportunities to enhance leisure skills/interests and/or coping mechanisms.
Patient denies SI,HI, or Hallucinations at this time. States at night is when he hears voices but less from the meds. Takes his meds and attends groups. Will continue to monitor.
Patient engaged in resident choice of movie or playing cards. Patient social with peers, and exhibited overall a pleasant behavior. Patient was 1 of 13 in attendance. Patient will continue to be provided with opportunities to enhance leisure skills/interests and/or coping mechanisms.
Pt discharged with followings belongings:   Clothing: Footwear, Pants, Shorts, Socks, Hat, Shirt (1 pair of flip flops, 4 shirts, 3 pair of jeans,3 pair of shorts, 1 pair of socks 1 hat.)  Other Valuables:  (1 lighter no cigarettes.)   Valuables sent home with Wallet. Valuables retrieved from safe, Security envelope number:  [id number] and returned to patient. Patient left department with Departure Mode: By self via Mobility at Departure: Ambulatory, discharged to  . Patient education on aftercare instructions: GIVEN  Patient verbalize understanding of AVS:  YES. Given suicide prevention handout GIVEN. Discharged in stable condition. Status EXAM upon discharge:  Mental Status and Behavioral Exam  Normal: No  Level of Assistance: Independent/Self  Facial Expression: Brightened  Affect: Congruent, Stable  Level of Consciousness: Alert  Frequency of Checks: 4 times per hour, close  Mood:Normal: No  Mood: Anxious  Motor Activity:Normal: Yes  Eye Contact: Good  Observed Behavior: Cooperative, Friendly  Sexual Misconduct History: Current - no  Preception: [place] to person, [place] to time, [place] to situation, [place] to place  Attention:Normal: Yes  Attention: Distractible  Thought Processes: Unremarkable  Thought Content:Normal: Yes  Thought Content: Preoccupations  Depression Symptoms: No problems reported or observed. Anxiety Symptoms: Generalized  Opal Symptoms: No problems reported or observed.   Hallucinations: None  Delusions: No  Delusions: Jealousy  Memory:Normal: Yes  Insight and Judgment: No  Insight and Judgment: Poor insight    Dc Aceves RN
Spiritual Support Group Note    Number of Participants in Group:   14                     Time: 1    Goal: Relief from isolation and loneliness             Linette Sharing             Self-understanding and gain insight              Acceptance and belonging            Recognize they are not alone                Socialization             Empowerment       Encouragement    Topic:  [x] Spiritual Wellness and Self Care                  [x] Hope                     [] Connecting with Divine/Others        [] Thankfulness and Gratitude               [x]  Meaningfulness and Purpose               [] Forgiveness               [] Peace               [] Connect to [employer]      [] Other    Participation Level:   [x] Active Listener   [] Minimal   [] Monopolizing   [] Interactive   [] No Participation   []  Other:     Attention:   [x] Alert   [] Distractible   [] Drowsy   [] Poor   [] Other:    Manner:   [x] Cooperative   [] Suspicious   [] Withdrawn   [] Guarded   [] Irritable   [] Inhospitable   [] Other:     Others Comments from Group:
Transportation Needs: Not on file   Physical Activity: Not on file   Stress: Not on file   Social Connections: Not on file   Intimate Partner Violence: Not on file   Housing Stability: Not on file           ROS:  [x] All negative/unchanged except if checked.  Explain positive(checked items) below:  [] Constitutional  [] Eyes  [] Ear/Nose/Mouth/Throat  [] Respiratory  [] CV  [] GI  []   [] Musculoskeletal  [] Skin/Breast  [] Neurological  [] Endocrine  [] Heme/Lymph  [] Allergic/Immunologic    Explanation:     MEDICATIONS:    Current Facility-Administered Medications:     acetaminophen (TYLENOL) tablet 650 mg, 650 mg, Oral, K2K PRN, Andrea Rubalcava APRN - CNP    magnesium hydroxide (MILK OF MAGNESIA) 400 MG/5ML suspension 30 mL, 30 mL, Oral, Daily PRN, Andrea Rubalcava APRN - CNP    nicotine (NICODERM CQ) 21 MG/24HR 1 patch, 1 patch, TransDERmal, Daily, KASHMIR Cox - CNP, 1 patch at 06/07/23 0900    aluminum & magnesium hydroxide-simethicone (MAALOX) 200-200-20 MG/5ML suspension 30 mL, 30 mL, Oral, PRN, Andrea Maok, APRN - CNP    hydrOXYzine pamoate (VISTARIL) capsule 50 mg, 50 mg, Oral, TID PRN, Andrea Rubalcava, APRN - CNP    haloperidol (HALDOL) tablet 5 mg, 5 mg, Oral, Q6H PRN **OR** haloperidol lactate (HALDOL) injection 5 mg, 5 mg, IntraMUSCular, J6G PRN, Andrea Rubalcava APRN - CNP    melatonin tablet 3 mg, 3 mg, Oral, Nightly, Andrea Rubalcava APRN - CNP, 3 mg at 06/06/23 2150    chlordiazePOXIDE (LIBRIUM) capsule 25 mg, 25 mg, Oral, T8N PRN, Andrea Rubalcava APRN - CNP    divalproex (DEPAKOTE) DR tablet 250 mg, 250 mg, Oral, 2 times per day, KASHMIR Berg CNP, 083 mg at 06/07/23 0900    risperiDONE (RISPERDAL) tablet 1 mg, 1 mg, Oral, BID, KASHMIR Berg - CNP, 1 mg at 06/07/23 0900      Examination:  /67   Pulse 61   Temp 96.9 °F (36.1 °C) (Oral)   Resp 15   Ht 5' 8\" (1.727 m)   Wt 135 lb (61.2 kg)   SpO2 100%   BMI 20.53 kg/m²   Gait - steady  Medication side

## 2023-06-18 ENCOUNTER — APPOINTMENT (OUTPATIENT)
Dept: CT IMAGING | Age: 23
End: 2023-06-18
Payer: MEDICAID

## 2023-06-18 ENCOUNTER — HOSPITAL ENCOUNTER (EMERGENCY)
Age: 23
Discharge: HOME | End: 2023-06-18
Payer: MEDICAID

## 2023-06-18 VITALS
HEART RATE: 88 BPM | TEMPERATURE: 97.3 F | OXYGEN SATURATION: 98 % | SYSTOLIC BLOOD PRESSURE: 135 MMHG | DIASTOLIC BLOOD PRESSURE: 77 MMHG | WEIGHT: 135 LBS | HEIGHT: 68 IN | RESPIRATION RATE: 20 BRPM | BODY MASS INDEX: 20.46 KG/M2

## 2023-06-18 DIAGNOSIS — G44.209 TENSION HEADACHE: Primary | ICD-10-CM

## 2023-06-18 PROCEDURE — 70450 CT HEAD/BRAIN W/O DYE: CPT

## 2023-06-18 PROCEDURE — 99284 EMERGENCY DEPT VISIT MOD MDM: CPT

## 2023-06-18 RX ORDER — NAPROXEN 500 MG/1
500 TABLET ORAL 2 TIMES DAILY
Qty: 14 TABLET | Refills: 0 | Status: SHIPPED | OUTPATIENT
Start: 2023-06-18 | End: 2023-06-25

## 2023-06-18 ASSESSMENT — PAIN SCALES - GENERAL: PAINLEVEL_OUTOF10: 7

## 2023-06-18 ASSESSMENT — PAIN - FUNCTIONAL ASSESSMENT: PAIN_FUNCTIONAL_ASSESSMENT: 0-10

## 2023-08-11 ENCOUNTER — HOSPITAL ENCOUNTER (EMERGENCY)
Age: 23
Discharge: HOME | End: 2023-08-11
Payer: COMMERCIAL

## 2023-08-11 ENCOUNTER — APPOINTMENT (OUTPATIENT)
Dept: GENERAL RADIOLOGY | Age: 23
End: 2023-08-11
Payer: COMMERCIAL

## 2023-08-11 VITALS
WEIGHT: 135 LBS | OXYGEN SATURATION: 99 % | SYSTOLIC BLOOD PRESSURE: 112 MMHG | HEART RATE: 88 BPM | DIASTOLIC BLOOD PRESSURE: 88 MMHG | RESPIRATION RATE: 14 BRPM | BODY MASS INDEX: 20.53 KG/M2 | TEMPERATURE: 98.3 F

## 2023-08-11 DIAGNOSIS — S62.396A CLOSED DISPLACED FRACTURE OF OTHER PART OF FIFTH METACARPAL BONE OF RIGHT HAND, INITIAL ENCOUNTER: Primary | ICD-10-CM

## 2023-08-11 PROCEDURE — 29125 APPL SHORT ARM SPLINT STATIC: CPT

## 2023-08-11 PROCEDURE — 99283 EMERGENCY DEPT VISIT LOW MDM: CPT

## 2023-08-11 PROCEDURE — 6370000000 HC RX 637 (ALT 250 FOR IP): Performed by: NURSE PRACTITIONER

## 2023-08-11 PROCEDURE — 73130 X-RAY EXAM OF HAND: CPT

## 2023-08-11 RX ORDER — IBUPROFEN 600 MG/1
600 TABLET ORAL 3 TIMES DAILY PRN
Qty: 21 TABLET | Refills: 0 | Status: SHIPPED | OUTPATIENT
Start: 2023-08-11 | End: 2023-08-18

## 2023-08-11 RX ORDER — IBUPROFEN 600 MG/1
600 TABLET ORAL ONCE
Status: COMPLETED | OUTPATIENT
Start: 2023-08-11 | End: 2023-08-11

## 2023-08-11 RX ORDER — ACETAMINOPHEN 500 MG
1000 TABLET ORAL 4 TIMES DAILY PRN
Qty: 30 TABLET | Refills: 0 | Status: SHIPPED | OUTPATIENT
Start: 2023-08-11

## 2023-08-11 RX ADMIN — IBUPROFEN 600 MG: 600 TABLET, FILM COATED ORAL at 15:16

## 2023-08-11 ASSESSMENT — PAIN DESCRIPTION - ORIENTATION: ORIENTATION: RIGHT

## 2023-08-11 ASSESSMENT — PAIN - FUNCTIONAL ASSESSMENT: PAIN_FUNCTIONAL_ASSESSMENT: NONE - DENIES PAIN

## 2023-08-11 ASSESSMENT — PAIN DESCRIPTION - LOCATION: LOCATION: HAND

## 2023-08-11 ASSESSMENT — PAIN SCALES - GENERAL: PAINLEVEL_OUTOF10: 8

## 2023-09-06 ENCOUNTER — HOSPITAL ENCOUNTER (EMERGENCY)
Age: 23
Discharge: OTHER INSTITUTION | End: 2023-09-07
Attending: EMERGENCY MEDICINE
Payer: COMMERCIAL

## 2023-09-06 DIAGNOSIS — F39 MOOD DISORDER (HCC): Primary | ICD-10-CM

## 2023-09-06 LAB
ALBUMIN SERPL-MCNC: 5.1 G/DL (ref 3.5–5.2)
ALP SERPL-CCNC: 107 U/L (ref 40–129)
ALT SERPL-CCNC: 10 U/L (ref 0–40)
AMPHET UR QL SCN: NEGATIVE
ANION GAP SERPL CALCULATED.3IONS-SCNC: 8 MMOL/L (ref 7–16)
APAP SERPL-MCNC: <5 UG/ML (ref 10–30)
AST SERPL-CCNC: 14 U/L (ref 0–39)
BARBITURATES UR QL SCN: NEGATIVE
BASOPHILS # BLD: 0.08 K/UL (ref 0–0.2)
BASOPHILS NFR BLD: 1 % (ref 0–2)
BENZODIAZ UR QL: NEGATIVE
BILIRUB SERPL-MCNC: 0.5 MG/DL (ref 0–1.2)
BUN SERPL-MCNC: 14 MG/DL (ref 6–20)
BUPRENORPHINE UR QL: NEGATIVE
CALCIUM SERPL-MCNC: 9.4 MG/DL (ref 8.6–10.2)
CANNABINOIDS UR QL SCN: POSITIVE
CHLORIDE SERPL-SCNC: 104 MMOL/L (ref 98–107)
CO2 SERPL-SCNC: 28 MMOL/L (ref 22–29)
COCAINE UR QL SCN: NEGATIVE
CREAT SERPL-MCNC: 1.1 MG/DL (ref 0.7–1.2)
EOSINOPHIL # BLD: 0.21 K/UL (ref 0.05–0.5)
EOSINOPHILS RELATIVE PERCENT: 2 % (ref 0–6)
ERYTHROCYTE [DISTWIDTH] IN BLOOD BY AUTOMATED COUNT: 12.2 % (ref 11.5–15)
ETHANOLAMINE SERPL-MCNC: <10 MG/DL
FENTANYL UR QL: NEGATIVE
GFR SERPL CREATININE-BSD FRML MDRD: >60 ML/MIN/1.73M2
GLUCOSE SERPL-MCNC: 90 MG/DL (ref 74–99)
HCT VFR BLD AUTO: 37.6 % (ref 37–54)
HGB BLD-MCNC: 13 G/DL (ref 12.5–16.5)
IMM GRANULOCYTES # BLD AUTO: 0.04 K/UL (ref 0–0.58)
IMM GRANULOCYTES NFR BLD: 0 % (ref 0–5)
LYMPHOCYTES NFR BLD: 2.71 K/UL (ref 1.5–4)
LYMPHOCYTES RELATIVE PERCENT: 30 % (ref 20–42)
MCH RBC QN AUTO: 30.3 PG (ref 26–35)
MCHC RBC AUTO-ENTMCNC: 34.6 G/DL (ref 32–34.5)
MCV RBC AUTO: 87.6 FL (ref 80–99.9)
METHADONE UR QL: NEGATIVE
MONOCYTES NFR BLD: 0.63 K/UL (ref 0.1–0.95)
MONOCYTES NFR BLD: 7 % (ref 2–12)
NEUTROPHILS NFR BLD: 60 % (ref 43–80)
NEUTS SEG NFR BLD: 5.53 K/UL (ref 1.8–7.3)
OPIATES UR QL SCN: NEGATIVE
OXYCODONE UR QL SCN: NEGATIVE
PCP UR QL SCN: NEGATIVE
PLATELET # BLD AUTO: 250 K/UL (ref 130–450)
PMV BLD AUTO: 10.7 FL (ref 7–12)
POTASSIUM SERPL-SCNC: 4 MMOL/L (ref 3.5–5)
PROT SERPL-MCNC: 7.4 G/DL (ref 6.4–8.3)
RBC # BLD AUTO: 4.29 M/UL (ref 3.8–5.8)
SALICYLATES SERPL-MCNC: <0.3 MG/DL (ref 0–30)
SODIUM SERPL-SCNC: 140 MMOL/L (ref 132–146)
TEST INFORMATION: ABNORMAL
TOXIC TRICYCLIC SC,BLOOD: NEGATIVE
WBC OTHER # BLD: 9.2 K/UL (ref 4.5–11.5)

## 2023-09-06 PROCEDURE — 93005 ELECTROCARDIOGRAM TRACING: CPT | Performed by: EMERGENCY MEDICINE

## 2023-09-06 PROCEDURE — 80143 DRUG ASSAY ACETAMINOPHEN: CPT

## 2023-09-06 PROCEDURE — 80179 DRUG ASSAY SALICYLATE: CPT

## 2023-09-06 PROCEDURE — 85025 COMPLETE CBC W/AUTO DIFF WBC: CPT

## 2023-09-06 PROCEDURE — 80053 COMPREHEN METABOLIC PANEL: CPT

## 2023-09-06 PROCEDURE — 80307 DRUG TEST PRSMV CHEM ANLYZR: CPT

## 2023-09-06 PROCEDURE — 99284 EMERGENCY DEPT VISIT MOD MDM: CPT

## 2023-09-06 PROCEDURE — G0480 DRUG TEST DEF 1-7 CLASSES: HCPCS

## 2023-09-06 ASSESSMENT — PAIN - FUNCTIONAL ASSESSMENT: PAIN_FUNCTIONAL_ASSESSMENT: NONE - DENIES PAIN

## 2023-09-07 VITALS
TEMPERATURE: 97.7 F | RESPIRATION RATE: 16 BRPM | HEART RATE: 82 BPM | DIASTOLIC BLOOD PRESSURE: 65 MMHG | SYSTOLIC BLOOD PRESSURE: 112 MMHG | OXYGEN SATURATION: 98 %

## 2023-09-07 LAB
EKG ATRIAL RATE: 77 BPM
EKG P AXIS: -17 DEGREES
EKG P-R INTERVAL: 132 MS
EKG Q-T INTERVAL: 352 MS
EKG QRS DURATION: 88 MS
EKG QTC CALCULATION (BAZETT): 398 MS
EKG R AXIS: 85 DEGREES
EKG T AXIS: 40 DEGREES
EKG VENTRICULAR RATE: 77 BPM

## 2023-09-07 PROCEDURE — 93010 ELECTROCARDIOGRAM REPORT: CPT | Performed by: INTERNAL MEDICINE

## 2023-09-07 ASSESSMENT — PAIN DESCRIPTION - DESCRIPTORS: DESCRIPTORS: THROBBING

## 2023-09-07 ASSESSMENT — PAIN DESCRIPTION - FREQUENCY: FREQUENCY: CONTINUOUS

## 2023-09-07 ASSESSMENT — PAIN SCALES - GENERAL: PAINLEVEL_OUTOF10: 7

## 2023-09-07 ASSESSMENT — PAIN DESCRIPTION - LOCATION: LOCATION: BACK

## 2023-09-07 ASSESSMENT — PAIN - FUNCTIONAL ASSESSMENT
PAIN_FUNCTIONAL_ASSESSMENT: NONE - DENIES PAIN
PAIN_FUNCTIONAL_ASSESSMENT: 0-10

## 2023-09-07 ASSESSMENT — PAIN DESCRIPTION - ONSET: ONSET: ON-GOING

## 2023-09-07 ASSESSMENT — PAIN DESCRIPTION - PAIN TYPE: TYPE: CHRONIC PAIN

## 2023-09-07 ASSESSMENT — PAIN DESCRIPTION - ORIENTATION: ORIENTATION: LOWER
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
## 2024-08-02 ENCOUNTER — HOSPITAL ENCOUNTER (INPATIENT)
Age: 24
LOS: 3 days | Discharge: ANOTHER HOSPITAL | DRG: 885 | End: 2024-08-05
Attending: STUDENT IN AN ORGANIZED HEALTH CARE EDUCATION/TRAINING PROGRAM | Admitting: PSYCHIATRY & NEUROLOGY
Payer: COMMERCIAL

## 2024-08-02 DIAGNOSIS — F48.9 MENTAL HEALTH PROBLEM: Primary | ICD-10-CM

## 2024-08-02 PROBLEM — F31.9 BIPOLAR DISORDER (HCC): Status: ACTIVE | Noted: 2024-08-02

## 2024-08-02 LAB
ALBUMIN SERPL-MCNC: 4 G/DL (ref 3.5–5.2)
ALP SERPL-CCNC: 85 U/L (ref 40–129)
ALT SERPL-CCNC: 29 U/L (ref 0–40)
AMPHET UR QL SCN: NEGATIVE
ANION GAP SERPL CALCULATED.3IONS-SCNC: 11 MMOL/L (ref 7–16)
APAP SERPL-MCNC: <5 UG/ML (ref 10–30)
AST SERPL-CCNC: 31 U/L (ref 0–39)
ATYPICAL LYMPHOCYTE ABSOLUTE COUNT: 1.83 K/UL (ref 0–0.46)
ATYPICAL LYMPHOCYTES: 17 % (ref 0–4)
BARBITURATES UR QL SCN: NEGATIVE
BASOPHILS # BLD: 0.09 K/UL (ref 0–0.2)
BASOPHILS NFR BLD: 1 % (ref 0–2)
BENZODIAZ UR QL: NEGATIVE
BILIRUB SERPL-MCNC: 0.7 MG/DL (ref 0–1.2)
BILIRUB UR QL STRIP: NEGATIVE
BUN SERPL-MCNC: 13 MG/DL (ref 6–20)
BUPRENORPHINE UR QL: NEGATIVE
CALCIUM SERPL-MCNC: 8.8 MG/DL (ref 8.6–10.2)
CANNABINOIDS UR QL SCN: POSITIVE
CHLORIDE SERPL-SCNC: 99 MMOL/L (ref 98–107)
CLARITY UR: CLEAR
CO2 SERPL-SCNC: 24 MMOL/L (ref 22–29)
COCAINE UR QL SCN: NEGATIVE
COLOR UR: YELLOW
COMMENT: ABNORMAL
CREAT SERPL-MCNC: 1.2 MG/DL (ref 0.7–1.2)
EOSINOPHIL # BLD: 0 K/UL (ref 0.05–0.5)
EOSINOPHILS RELATIVE PERCENT: 0 % (ref 0–6)
ERYTHROCYTE [DISTWIDTH] IN BLOOD BY AUTOMATED COUNT: 12.8 % (ref 11.5–15)
ETHANOLAMINE SERPL-MCNC: <10 MG/DL (ref 0–0.08)
FENTANYL UR QL: NEGATIVE
GFR, ESTIMATED: >90 ML/MIN/1.73M2
GLUCOSE SERPL-MCNC: 97 MG/DL (ref 74–99)
GLUCOSE UR STRIP-MCNC: NEGATIVE MG/DL
HCT VFR BLD AUTO: 41.9 % (ref 37–54)
HGB BLD-MCNC: 14.5 G/DL (ref 12.5–16.5)
HGB UR QL STRIP.AUTO: NEGATIVE
KETONES UR STRIP-MCNC: NEGATIVE MG/DL
LEUKOCYTE ESTERASE UR QL STRIP: NEGATIVE
LYMPHOCYTES NFR BLD: 3.01 K/UL (ref 1.5–4)
LYMPHOCYTES RELATIVE PERCENT: 29 % (ref 20–42)
MCH RBC QN AUTO: 30.1 PG (ref 26–35)
MCHC RBC AUTO-ENTMCNC: 34.6 G/DL (ref 32–34.5)
MCV RBC AUTO: 87.1 FL (ref 80–99.9)
METHADONE UR QL: NEGATIVE
MONOCYTES NFR BLD: 0.64 K/UL (ref 0.1–0.95)
MONOCYTES NFR BLD: 6 % (ref 2–12)
NEUTROPHILS NFR BLD: 47 % (ref 43–80)
NEUTS SEG NFR BLD: 4.93 K/UL (ref 1.8–7.3)
NITRITE UR QL STRIP: NEGATIVE
OPIATES UR QL SCN: NEGATIVE
OXYCODONE UR QL SCN: NEGATIVE
PCP UR QL SCN: NEGATIVE
PH UR STRIP: 5.5 [PH] (ref 5–9)
PLATELET # BLD AUTO: 179 K/UL (ref 130–450)
PMV BLD AUTO: 10.7 FL (ref 7–12)
POTASSIUM SERPL-SCNC: 4.7 MMOL/L (ref 3.5–5)
PROT SERPL-MCNC: 6.2 G/DL (ref 6.4–8.3)
PROT UR STRIP-MCNC: NEGATIVE MG/DL
RBC # BLD AUTO: 4.81 M/UL (ref 3.8–5.8)
RBC # BLD: NORMAL 10*6/UL
SALICYLATES SERPL-MCNC: <0.3 MG/DL (ref 0–30)
SODIUM SERPL-SCNC: 134 MMOL/L (ref 132–146)
SP GR UR STRIP: >1.03 (ref 1–1.03)
TEST INFORMATION: ABNORMAL
TOXIC TRICYCLIC SC,BLOOD: NEGATIVE
TSH SERPL DL<=0.05 MIU/L-ACNC: 1.4 UIU/ML (ref 0.27–4.2)
UROBILINOGEN UR STRIP-ACNC: 0.2 EU/DL (ref 0–1)
WBC OTHER # BLD: 10.5 K/UL (ref 4.5–11.5)

## 2024-08-02 PROCEDURE — 84443 ASSAY THYROID STIM HORMONE: CPT

## 2024-08-02 PROCEDURE — 93005 ELECTROCARDIOGRAM TRACING: CPT | Performed by: PHYSICIAN ASSISTANT

## 2024-08-02 PROCEDURE — 85025 COMPLETE CBC W/AUTO DIFF WBC: CPT

## 2024-08-02 PROCEDURE — 80307 DRUG TEST PRSMV CHEM ANLYZR: CPT

## 2024-08-02 PROCEDURE — 80179 DRUG ASSAY SALICYLATE: CPT

## 2024-08-02 PROCEDURE — G0480 DRUG TEST DEF 1-7 CLASSES: HCPCS

## 2024-08-02 PROCEDURE — 1240000000 HC EMOTIONAL WELLNESS R&B

## 2024-08-02 PROCEDURE — 80053 COMPREHEN METABOLIC PANEL: CPT

## 2024-08-02 PROCEDURE — 81003 URINALYSIS AUTO W/O SCOPE: CPT

## 2024-08-02 PROCEDURE — 80143 DRUG ASSAY ACETAMINOPHEN: CPT

## 2024-08-02 PROCEDURE — 99285 EMERGENCY DEPT VISIT HI MDM: CPT

## 2024-08-02 ASSESSMENT — PAIN - FUNCTIONAL ASSESSMENT: PAIN_FUNCTIONAL_ASSESSMENT: NONE - DENIES PAIN

## 2024-08-02 NOTE — ED PROVIDER NOTES
Shared SUNG-ED Attending Visit.  CC: No       [facility]  Department of Emergency Medicine   ED  Encounter Note  Admit Date/RoomTime: 2024  4:39 PM  ED Room: 7309/7309-A  NAME: Micheal Branch  : 2000  MRN: [id number]     Chief Complaint:  Psychiatric Evaluation (Pt states he has been out of his meds for the past month and doesn't want to have thoughts of execution. \"I have had thoughts for the past month\")    HISTORY OF PRESENT ILLNESS        Micheal Branch is a 23 y.o. male who presents to the ED stating is off his medications, having thoughts of suicide, and wants admitted to the psych cortes.  Patient states he ran out of his insurance about a month ago.  Was unable to get any of his medications filled.  He has been off of his medications for the past month.  He is having thoughts of suicide, but no specific plan.  States he daydreams of suicidal thoughts.  He does smoke.  Denies alcohol use.  Admits to marijuana use.  Denies other drug use.  Denies any recent illness.  Denies fevers or chills.  Denies cough or congestion.  Denies vomiting or diarrhea.  Symptoms are moderate in severity.      ROS   Pertinent positives and negatives are stated within HPI, all other systems reviewed and are negative.    Past Medical History:  has a past medical history of ADHD, Depression, Intussusception (HCC), and Oppositional defiant disorder.    Surgical History:  has a past surgical history that includes Abdomen surgery.    Social History:  reports that he has been smoking cigarettes. He has never used smokeless tobacco. He reports current drug use. He reports that he does not drink alcohol.    Family History: family history is not on file.     Allergies: Patient has no known allergies.    PHYSICAL EXAM   Oxygen Saturation Interpretation: Normal on room air analysis.        ED Triage Vitals [24 1620]   BP Temp Temp src Pulse Resp SpO2 Height Weight   -- 98.8 °F (37.1 °C) -- (!) 120 -- 95 % --

## 2024-08-03 PROBLEM — F31.2 SEVERE MANIC BIPOLAR 1 DISORDER WITH PSYCHOTIC BEHAVIOR (HCC): Status: RESOLVED | Noted: 2023-06-06 | Resolved: 2024-08-03

## 2024-08-03 PROBLEM — F31.63 BIPOLAR AFFECTIVE DISORDER, MIXED, SEVERE (HCC): Status: ACTIVE | Noted: 2024-08-02

## 2024-08-03 PROCEDURE — 6370000000 HC RX 637 (ALT 250 FOR IP): Performed by: NURSE PRACTITIONER

## 2024-08-03 PROCEDURE — 6370000000 HC RX 637 (ALT 250 FOR IP): Performed by: PSYCHIATRY & NEUROLOGY

## 2024-08-03 PROCEDURE — 1240000000 HC EMOTIONAL WELLNESS R&B

## 2024-08-03 PROCEDURE — 90792 PSYCH DIAG EVAL W/MED SRVCS: CPT | Performed by: NURSE PRACTITIONER

## 2024-08-03 RX ORDER — LANOLIN ALCOHOL/MO/W.PET/CERES
3 CREAM (GRAM) TOPICAL NIGHTLY PRN
Status: DISCONTINUED | OUTPATIENT
Start: 2024-08-03 | End: 2024-08-05 | Stop reason: HOSPADM

## 2024-08-03 RX ORDER — NICOTINE 21 MG/24HR
1 PATCH, TRANSDERMAL 24 HOURS TRANSDERMAL DAILY
Status: DISCONTINUED | OUTPATIENT
Start: 2024-08-03 | End: 2024-08-05 | Stop reason: HOSPADM

## 2024-08-03 RX ORDER — OLANZAPINE 10 MG/1
10 TABLET ORAL NIGHTLY
Status: ON HOLD | COMMUNITY
End: 2024-08-07

## 2024-08-03 RX ORDER — RISPERIDONE 1 MG/1
0.5 TABLET ORAL 2 TIMES DAILY
Status: COMPLETED | OUTPATIENT
Start: 2024-08-03 | End: 2024-08-03

## 2024-08-03 RX ORDER — MAGNESIUM HYDROXIDE/ALUMINUM HYDROXICE/SIMETHICONE 120; 1200; 1200 MG/30ML; MG/30ML; MG/30ML
30 SUSPENSION ORAL PRN
Status: DISCONTINUED | OUTPATIENT
Start: 2024-08-03 | End: 2024-08-05 | Stop reason: HOSPADM

## 2024-08-03 RX ORDER — DIVALPROEX SODIUM 250 MG/1
250 TABLET, DELAYED RELEASE ORAL EVERY 12 HOURS SCHEDULED
Status: DISCONTINUED | OUTPATIENT
Start: 2024-08-03 | End: 2024-08-05 | Stop reason: HOSPADM

## 2024-08-03 RX ORDER — ACETAMINOPHEN 325 MG/1
650 TABLET ORAL EVERY 6 HOURS PRN
Status: DISCONTINUED | OUTPATIENT
Start: 2024-08-03 | End: 2024-08-05 | Stop reason: HOSPADM

## 2024-08-03 RX ORDER — HALOPERIDOL 5 MG/ML
5 INJECTION INTRAMUSCULAR EVERY 6 HOURS PRN
Status: DISCONTINUED | OUTPATIENT
Start: 2024-08-03 | End: 2024-08-05 | Stop reason: HOSPADM

## 2024-08-03 RX ORDER — HYDROXYZINE PAMOATE 50 MG/1
50 CAPSULE ORAL 3 TIMES DAILY PRN
Status: DISCONTINUED | OUTPATIENT
Start: 2024-08-03 | End: 2024-08-05 | Stop reason: HOSPADM

## 2024-08-03 RX ORDER — RISPERIDONE 1 MG/1
1 TABLET ORAL NIGHTLY
Status: DISCONTINUED | OUTPATIENT
Start: 2024-08-04 | End: 2024-08-05 | Stop reason: HOSPADM

## 2024-08-03 RX ORDER — HALOPERIDOL 5 MG/1
5 TABLET ORAL EVERY 6 HOURS PRN
Status: DISCONTINUED | OUTPATIENT
Start: 2024-08-03 | End: 2024-08-05 | Stop reason: HOSPADM

## 2024-08-03 RX ORDER — RISPERIDONE 0.5 MG/1
0.5 TABLET ORAL DAILY
Status: DISCONTINUED | OUTPATIENT
Start: 2024-08-04 | End: 2024-08-05 | Stop reason: HOSPADM

## 2024-08-03 RX ADMIN — DIVALPROEX SODIUM 250 MG: 250 TABLET, DELAYED RELEASE ORAL at 21:51

## 2024-08-03 RX ADMIN — HYDROXYZINE PAMOATE 50 MG: 50 CAPSULE ORAL at 21:55

## 2024-08-03 RX ADMIN — ACETAMINOPHEN 650 MG: 325 TABLET ORAL at 17:16

## 2024-08-03 RX ADMIN — RISPERIDONE 0.5 MG: 1 TABLET, FILM COATED ORAL at 21:51

## 2024-08-03 RX ADMIN — ACETAMINOPHEN 650 MG: 325 TABLET ORAL at 03:20

## 2024-08-03 RX ADMIN — RISPERIDONE 0.5 MG: 1 TABLET, FILM COATED ORAL at 12:57

## 2024-08-03 ASSESSMENT — SLEEP AND FATIGUE QUESTIONNAIRES
DO YOU HAVE DIFFICULTY SLEEPING: YES
SLEEP PATTERN: DISTURBED/INTERRUPTED SLEEP
DO YOU HAVE DIFFICULTY SLEEPING: YES
DO YOU USE A SLEEP AID: NO
AVERAGE NUMBER OF SLEEP HOURS: 8
SLEEP PATTERN: INSOMNIA;RESTLESSNESS;DISTURBED/INTERRUPTED SLEEP;DIFFICULTY FALLING ASLEEP
DO YOU USE A SLEEP AID: COMMENT

## 2024-08-03 ASSESSMENT — PATIENT HEALTH QUESTIONNAIRE - PHQ9
SUM OF ALL RESPONSES TO PHQ QUESTIONS 1-9: 21
4. FEELING TIRED OR HAVING LITTLE ENERGY: NEARLY EVERY DAY
7. TROUBLE CONCENTRATING ON THINGS, SUCH AS READING THE NEWSPAPER OR WATCHING TELEVISION: NEARLY EVERY DAY
SUM OF ALL RESPONSES TO PHQ9 QUESTIONS 1 & 2: 6
10. IF YOU CHECKED OFF ANY PROBLEMS, HOW DIFFICULT HAVE THESE PROBLEMS MADE IT FOR YOU TO DO YOUR WORK, TAKE CARE OF THINGS AT HOME, OR GET ALONG WITH OTHER PEOPLE: EXTREMELY DIFFICULT
SUM OF ALL RESPONSES TO PHQ QUESTIONS 1-9: 19
7. TROUBLE CONCENTRATING ON THINGS, SUCH AS READING THE NEWSPAPER OR WATCHING TELEVISION: NOT AT ALL
8. MOVING OR SPEAKING SO SLOWLY THAT OTHER PEOPLE COULD HAVE NOTICED. OR THE OPPOSITE, BEING SO FIGETY OR RESTLESS THAT YOU HAVE BEEN MOVING AROUND A LOT MORE THAN USUAL: SEVERAL DAYS
1. LITTLE INTEREST OR PLEASURE IN DOING THINGS: NEARLY EVERY DAY
SUM OF ALL RESPONSES TO PHQ QUESTIONS 1-9: 25
1. LITTLE INTEREST OR PLEASURE IN DOING THINGS: NEARLY EVERY DAY
10. IF YOU CHECKED OFF ANY PROBLEMS, HOW DIFFICULT HAVE THESE PROBLEMS MADE IT FOR YOU TO DO YOUR WORK, TAKE CARE OF THINGS AT HOME, OR GET ALONG WITH OTHER PEOPLE: VERY DIFFICULT
4. FEELING TIRED OR HAVING LITTLE ENERGY: NEARLY EVERY DAY
3. TROUBLE FALLING OR STAYING ASLEEP: NEARLY EVERY DAY
6. FEELING BAD ABOUT YOURSELF - OR THAT YOU ARE A FAILURE OR HAVE LET YOURSELF OR YOUR FAMILY DOWN: NEARLY EVERY DAY
SUM OF ALL RESPONSES TO PHQ QUESTIONS 1-9: 21
SUM OF ALL RESPONSES TO PHQ QUESTIONS 1-9: 21
SUM OF ALL RESPONSES TO PHQ QUESTIONS 1-9: 25
SUM OF ALL RESPONSES TO PHQ9 QUESTIONS 1 & 2: 6
8. MOVING OR SPEAKING SO SLOWLY THAT OTHER PEOPLE COULD HAVE NOTICED. OR THE OPPOSITE, BEING SO FIGETY OR RESTLESS THAT YOU HAVE BEEN MOVING AROUND A LOT MORE THAN USUAL: NEARLY EVERY DAY
SUM OF ALL RESPONSES TO PHQ QUESTIONS 1-9: 24
6. FEELING BAD ABOUT YOURSELF - OR THAT YOU ARE A FAILURE OR HAVE LET YOURSELF OR YOUR FAMILY DOWN: NEARLY EVERY DAY
9. THOUGHTS THAT YOU WOULD BE BETTER OFF DEAD, OR OF HURTING YOURSELF: SEVERAL DAYS
5. POOR APPETITE OR OVEREATING: NEARLY EVERY DAY
2. FEELING DOWN, DEPRESSED OR HOPELESS: NEARLY EVERY DAY
5. POOR APPETITE OR OVEREATING: NEARLY EVERY DAY
2. FEELING DOWN, DEPRESSED OR HOPELESS: NEARLY EVERY DAY
SUM OF ALL RESPONSES TO PHQ QUESTIONS 1-9: 25
3. TROUBLE FALLING OR STAYING ASLEEP: NEARLY EVERY DAY
9. THOUGHTS THAT YOU WOULD BE BETTER OFF DEAD, OR OF HURTING YOURSELF: MORE THAN HALF THE DAYS

## 2024-08-03 ASSESSMENT — LIFESTYLE VARIABLES
HOW MANY STANDARD DRINKS CONTAINING ALCOHOL DO YOU HAVE ON A TYPICAL DAY: 5 OR 6
HOW OFTEN DO YOU HAVE A DRINK CONTAINING ALCOHOL: MONTHLY OR LESS
HOW OFTEN DO YOU HAVE A DRINK CONTAINING ALCOHOL: MONTHLY OR LESS
HOW MANY STANDARD DRINKS CONTAINING ALCOHOL DO YOU HAVE ON A TYPICAL DAY: 1 OR 2

## 2024-08-03 ASSESSMENT — PAIN SCALES - GENERAL
PAINLEVEL_OUTOF10: 7

## 2024-08-03 ASSESSMENT — PAIN DESCRIPTION - DESCRIPTORS
DESCRIPTORS: ACHING
DESCRIPTORS: ACHING;CRAMPING;DISCOMFORT
DESCRIPTORS: ACHING

## 2024-08-03 ASSESSMENT — PAIN DESCRIPTION - ONSET
ONSET: ON-GOING
ONSET: ON-GOING

## 2024-08-03 ASSESSMENT — PAIN DESCRIPTION - ORIENTATION: ORIENTATION: LEFT;OUTER

## 2024-08-03 ASSESSMENT — PAIN DESCRIPTION - FREQUENCY
FREQUENCY: CONTINUOUS
FREQUENCY: CONTINUOUS

## 2024-08-03 ASSESSMENT — PAIN DESCRIPTION - LOCATION
LOCATION: THROAT
LOCATION: HEAD
LOCATION: HEAD

## 2024-08-03 ASSESSMENT — PAIN - FUNCTIONAL ASSESSMENT
PAIN_FUNCTIONAL_ASSESSMENT: ACTIVITIES ARE NOT PREVENTED
PAIN_FUNCTIONAL_ASSESSMENT: ACTIVITIES ARE NOT PREVENTED

## 2024-08-03 ASSESSMENT — PAIN DESCRIPTION - DIRECTION: RADIATING_TOWARDS: THROAT

## 2024-08-03 ASSESSMENT — PAIN DESCRIPTION - PAIN TYPE
TYPE: ACUTE PAIN
TYPE: ACUTE PAIN

## 2024-08-03 NOTE — ED NOTES
Cady stated she would not have time to see this patient and that patient should be presented to psychiatrist without a  assessment.

## 2024-08-03 NOTE — GROUP NOTE
Group Therapy Note    Date: 8/3/2024    Group Start Time: 1045  Group End Time: 1120  Group Topic: Cognitive Skills    [facility] ACUTE [facility] 1    Tu Duarte MSW, DIANNA        Group Therapy Note    Attendees: 11       Patient's Goal: To participate in group discussion active listening.     Notes: pt was an active listener in group.     Status After Intervention:  Unchanged    Participation Level: Active Listener    Participation Quality: Appropriate and Attentive      Speech:  normal      Thought Process/Content: Logical      Affective Functioning: Congruent      Mood: anxious      Level of consciousness:  Alert and Oriented x4      Response to Learning: Able to verbalize current knowledge/experience, Able to verbalize/acknowledge new learning, and Able to retain information      Endings: None Reported    Modes of Intervention: Education, Support, Socialization, Exploration, Clarifying, and Problem-solving      Discipline Responsible: /Counselor      Signature:  URSULA Mc LSW

## 2024-08-03 NOTE — GROUP NOTE
Group Therapy Note    Date: 8/3/2024  Start Time: 0930  End Time:  09650  Number of Participants: 6    Type of Group: Community Meeting    Wellness Binder Information  Module Name:  Community Meeting      Patient's Goal:  Patient will be oriented to the unit including but not limited expectations, staff, programming schedule.  Patient will be able to ID expectations of treatment.     Notes: Patient was a participant in community meeting, and was updated on expectations of the unit, staffing, programming schedule, and acclimated to their environment.    Patient shared goal for today as \"Call my fiance and let her know Im okay\"    Status After Intervention:  Unchanged    Participation Level: Active Listener and Interactive    Participation Quality: Appropriate and Attentive      Speech:  normal      Thought Process/Content: Logical      Affective Functioning: Congruent      Mood: anxious      Level of consciousness:  Alert and Attentive      Response to Learning: Able to verbalize current knowledge/experience, Able to verbalize/acknowledge new learning, and Progressing to goal      Endings: None Reported    Modes of Intervention: Exploration, Clarifying, and Problem-solving      Discipline Responsible: Recreational Therapist      Signature:  KRISSY Saldaña

## 2024-08-03 NOTE — ED NOTES
Patient currently resting with eyes closed, respirations non-labored, skin warm/dry, no distress noted. Patient responds to voice, oriented x 4 when awake. Patient calm and cooperative at present. Patient denies SI, HI, or any hallucinations at present. Patient voicing no complaints at present. Patient cooperative for vitals.
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
## 2024-08-03 NOTE — PROGRESS NOTES
Due to the behaviors on the milieu,  patient was encouraged to engage in more independent materials this afternoon.  Patient was provided with reading materials, coloring pages, word searches, and cards for patient to utilize.  Patient will continue to be provided with opportunities to enhance leisure skills/interests and/or coping mechanisms.

## 2024-08-03 NOTE — PROGRESS NOTES
4 Eyes Skin Assessment     NAME:  Micheal Branch  YOB: 2000  MEDICAL RECORD NUMBER:  [id number]    The patient is being assessed for  Admission    I agree that at least one RN has performed a thorough Head to Toe Skin Assessment on the patient. ALL assessment sites listed below have been assessed.      Areas assessed by both nurses:    Head, Face, Ears, Shoulders, Back, Chest, Arms, Elbows, Hands, Sacrum. Buttock, Coccyx, Ischium, and Legs. Feet and Heels        Does the Patient have a Wound? Yes wound(s) were present on assessment. LDA wound assessment was Initiated and completed by RN       Kael Prevention initiated by RN: Yes  Wound Care Orders initiated by RN: No    Pressure Injury (Stage 3,4, Unstageable, DTI, NWPT, and Complex wounds) if present, place Wound referral order by RN under : No    New Ostomies, if present place, Ostomy referral order under : No     Nurse 1 eSignature: Electronically signed by Constance Rodriguez RN on 8/3/24 at 4:38 AM EDT    **SHARE this note so that the co-signing nurse can place an eSignature**    Nurse 2 eSignature: {Esignature:[id number]}

## 2024-08-03 NOTE — GROUP NOTE
Date: 8/3/2024  Start Time: 0950  End Time:  1030  Number of Participants: 6    Type of Group: Psychoeducation    Wellness Binder Information  Module Name:  Five ways to well being    Patient's Goal:  Patient will be able to ID one area they can improve upon to maintain/improve overall well being.     Notes:  CTRS discussed five ways to be well, and different categories of well being.  Patient was an active participant in discussion and was accepting of handout provided.  Patient receptive to the information provided, and able to ID at least one area one can improve on.      Status After Intervention:  Improved    Participation Level: Active Listener and Interactive    Participation Quality: Appropriate and Attentive      Speech:  normal      Thought Process/Content: Logical      Affective Functioning: Congruent      Mood: anxious      Level of consciousness:  Alert and Attentive      Response to Learning: Able to verbalize current knowledge/experience, Able to verbalize/acknowledge new learning, and Progressing to goal      Endings: None Reported    Modes of Intervention: Education, Support, Exploration, Clarifying, and Problem-solving      Discipline Responsible: Recreational Therapist      Signature:  KRISSY Saldaña

## 2024-08-03 NOTE — PLAN OF CARE
[facility]  Initial Interdisciplinary Treatment Plan Note      Original treatment plan Date & Time: 8/3/2024  8:35 AM     Admission Type:  Admission Type: Voluntary    Reason for admission:   Reason for Admission: \"to get help, before I did something I might regret, thoughts to jump off bridge\"    Estimated Length of Stay:  5-7days  Estimated Discharge Date: To be determined by physician.    PATIENT STRENGTHS:  Patient Strengths:   Patient Strengths and Limitations:   Addictive Behavior: Addictive Behavior  In the Past 3 Months, Have You Felt or Has Someone Told You That You Have a Problem With  : None  Medical Problems:  Past Medical History:   Diagnosis Date    ADHD     Depression     Intussusception (HCC)     Oppositional defiant disorder      Status EXAM:Mental Status and Behavioral Exam  Normal: No  Level of Assistance: Independent/Self  Facial Expression: Sad  Affect: Appropriate  Level of Consciousness: Alert  Frequency of Checks: 4 times per hour, close  Mood:Normal: No  Mood: Depressed, Anxious, Helpless, Worthless, low self-esteem, Sad  Motor Activity:Normal: Yes  Eye Contact: Good  Observed Behavior: Cooperative, Friendly  Sexual Misconduct History: Current - no  Preception: [place] to person, [place] to time, [place] to place, [place] to situation  Attention:Normal: Yes  Thought Processes: Unremarkable  Thought Content:Normal: Yes  Depression Symptoms: Change in energy level, Feelings of helplessness, Feelings of hopelessess, Feelings of worthlessness, Sleep disturbance  Anxiety Symptoms: Generalized  Opal Symptoms: No problems reported or observed.  Hallucinations: None (July 4th heard his living sister's voice calling him worthless)  Delusions: No  Memory:Normal: Yes  Insight and Judgment: No  Insight and Judgment: Poor judgment, Poor insight    EDUCATION:   Learner Progress Toward Treatment Goals: Will review group plans and strategies for care.    Method: Group therapy, Medication

## 2024-08-03 NOTE — ED NOTES
Spoke to Dr. Tucker, patient accepted to [address] [place] [address]. Spoke to Min RN who stated patent will go to bed 7521B. Called and notified in admitting.

## 2024-08-03 NOTE — PLAN OF CARE
Problem: Anxiety  Goal: Will report anxiety at manageable levels  Description: INTERVENTIONS:  1. Administer medication as ordered  2. Teach and rehearse alternative coping skills  3. Provide emotional support with 1:1 interaction with staff  8/3/2024 1448 by Raisa Hall RN  Outcome: Progressing  8/3/2024 0237 by Constance Rodriguez RN  Outcome: Progressing     Problem: Depression/Self Harm  Goal: Effect of psychiatric condition will be minimized and patient will be protected from self harm  Description: INTERVENTIONS:  1. Assess impact of patient's symptoms on level of functioning, self care needs and offer support as indicated  2. Assess patient/family knowledge of depression, impact on illness and need for teaching  3. Provide emotional support, presence and reassurance  4. Assess for possible suicidal thoughts or ideation. If patient expresses suicidal thoughts or statements do not leave alone, initiate Suicide Precautions, move to a room close to the nursing station and obtain sitter  5. Initiate consults as appropriate with Mental Health Professional, Spiritual Care, Psychosocial CNS, and consider a recommendation to the LIP for a Psychiatric Consultation  8/3/2024 1448 by Raisa Hall RN  Outcome: Progressing  8/3/2024 0237 by Constance Rodriguez RN  Outcome: Progressing      Pleasant and cooperative. Flat , sad and anxious.  Worried about place to go on discharge. [facility]. Denies suicidal and homicidal thoughts. Denies hallucinations.

## 2024-08-03 NOTE — H&P
ideas or loose associations  Thought content: Devoid of any auditory visual hallucinations delusions or other perceptual normalities.    Denies SI/HI intent or plan   Language: able to name objects and repeate phrases  Memory intact  Cognition:  oriented to person, place, and time   Fund of Knowledge: Vocabulary intact, pt is aware of current events and past history  Attention and Concentration intact  Insight fair   Judgement fair      DIAGNOSIS:    Bipolar affective disorder, mixed, severe  Cluster B personality disorder      LABS: REVIEWED TODAY:  Recent Labs     08/02/24  1709   WBC 10.5   HGB 14.5        Recent Labs     08/02/24  1709      K 4.7   CL 99   CO2 24   BUN 13   CREATININE 1.2   GLUCOSE 97     Recent Labs     08/02/24  1709   BILITOT 0.7   ALKPHOS 85   AST 31   ALT 29     Lab Results   Component Value Date/Time    BARBSCNU NEGATIVE 08/02/2024 05:56 PM    LABBENZ NEGATIVE 08/02/2024 05:56 PM    LABMETH NEGATIVE 08/02/2024 05:56 PM    ETOH <10 08/02/2024 05:09 PM     Lab Results   Component Value Date/Time    TSH 1.40 08/02/2024 05:09 PM     No results found for: \"LITHIUM\"  No results found for: \"VALPROATE\", \"CBMZ\"  No results found for: \"LITHIUM\", \"VALPROATE\"      Radiology No results found.      TREATMENT PLAN:  The patient's diagnosis, treatment plan, medication management were formulated after patient was seen directly by the attending physician and myself and all relevant documentation was reviewed.    Risk, benefit, side effects, possible outcomes of the medication and alternatives discussed with the patient and the patient demonstrated understanding.  The patient was also educated that the outcome of treatment will depend on the medication compliance as directed by the prescribers along with regular follow-up, compliance with the labs and other work-up, as clinically indicated.    Risk Management: Based on the diagnosis and assessment biopsychosocial treatment model was presented to

## 2024-08-03 NOTE — CARE COORDINATION
Biopsychosocial Assessment Note    Social work met with patient to complete the biopsychosocial assessment and C-SSRS.     Chief Complaint: pt reports \"I was suicidal.\"     Mental Status Exam: pt alert&oriented x4. Pt cooperative. Pt mood depressed, anxious, affect congruent. Pt eye contact was fair, speech was clear. Pt thoughts circumstantial, preoccupied. Pt insight/judgement poor. Pt denied SI/HI/AVH.    Clinical Summary: pt reports he was suicidal because he could not handle the place he was staying anymore. Pt reports he told the people he was staying with his dad was in the hospital as a cover so they wouldn't stop him from coming here. Pt reports his dad would drop food off for him at the home but the other people in the home would only give him one meal a day. Pt reports they also wouldn't let him shower, would keep him up all night long and would play loud music anytime he tired to sleep, took the majority of the money he got from donating plasma, would leave their kids with him, and would make him do all of the chores around the house. Pt reports he couldn't handle this anymore so he told his fiance he wanted to get someone's gun, jump off a bridge, or try to hang himself. Per ER provider note, \" 23 y.o. male who presents to the ED stating is off his medications, having thoughts of suicide, and wants admitted to the psych cort[facility].Patient states he ran out of his insurance about a month ago.  Was unable to get any of his medications filled.  He has been off of his medications for the past month.He is having thoughts of suicide, but no specific plan.  States he daydreams of suicidal thoughts\"    Pt reports a hx of inpatient psychiatric admissions at this facility, [facility], [facility], [facility], etc. Pt reports he has not been able to see his outpatient provider or get any of his medications refilled for a month because he temporarily lost his insurance. Pt stated he now has insurance so he

## 2024-08-03 NOTE — DISCHARGE INSTRUCTIONS
Patient was offered a referral to substance abuse treatment, patient declined referral at this time.      Follow up for Tobacco Cessation at:    [facility]                                 Date:  Friday *** at 10am              [address]    [place] [address]   (Inside [facility]    take B elevators to 7th floor)   Phone: [phone]   Fax: [phone]

## 2024-08-04 LAB
B PARAP IS1001 DNA NPH QL NAA+NON-PROBE: NOT DETECTED
B PERT DNA SPEC QL NAA+PROBE: NOT DETECTED
C PNEUM DNA NPH QL NAA+NON-PROBE: NOT DETECTED
CHOLEST SERPL-MCNC: 114 MG/DL
FLUAV RNA NPH QL NAA+NON-PROBE: NOT DETECTED
FLUBV RNA NPH QL NAA+NON-PROBE: NOT DETECTED
HADV DNA NPH QL NAA+NON-PROBE: NOT DETECTED
HBA1C MFR BLD: 5.3 % (ref 4–5.6)
HCOV 229E RNA NPH QL NAA+NON-PROBE: NOT DETECTED
HCOV HKU1 RNA NPH QL NAA+NON-PROBE: NOT DETECTED
HCOV NL63 RNA NPH QL NAA+NON-PROBE: NOT DETECTED
HCOV OC43 RNA NPH QL NAA+NON-PROBE: NOT DETECTED
HDLC SERPL-MCNC: 33 MG/DL
HMPV RNA NPH QL NAA+NON-PROBE: NOT DETECTED
HPIV1 RNA NPH QL NAA+NON-PROBE: NOT DETECTED
HPIV2 RNA NPH QL NAA+NON-PROBE: NOT DETECTED
HPIV3 RNA NPH QL NAA+NON-PROBE: NOT DETECTED
HPIV4 RNA NPH QL NAA+NON-PROBE: NOT DETECTED
LDLC SERPL CALC-MCNC: 70 MG/DL
M PNEUMO DNA NPH QL NAA+NON-PROBE: NOT DETECTED
RSV RNA NPH QL NAA+NON-PROBE: NOT DETECTED
RV+EV RNA NPH QL NAA+NON-PROBE: NOT DETECTED
SARS-COV-2 RNA NPH QL NAA+NON-PROBE: NOT DETECTED
SPECIMEN DESCRIPTION: NORMAL
TRIGL SERPL-MCNC: 57 MG/DL
VLDLC SERPL CALC-MCNC: 11 MG/DL

## 2024-08-04 PROCEDURE — 6370000000 HC RX 637 (ALT 250 FOR IP): Performed by: NURSE PRACTITIONER

## 2024-08-04 PROCEDURE — 6370000000 HC RX 637 (ALT 250 FOR IP): Performed by: PSYCHIATRY & NEUROLOGY

## 2024-08-04 PROCEDURE — 80061 LIPID PANEL: CPT

## 2024-08-04 PROCEDURE — 83036 HEMOGLOBIN GLYCOSYLATED A1C: CPT

## 2024-08-04 PROCEDURE — 86308 HETEROPHILE ANTIBODY SCREEN: CPT

## 2024-08-04 PROCEDURE — 99232 SBSQ HOSP IP/OBS MODERATE 35: CPT | Performed by: NURSE PRACTITIONER

## 2024-08-04 PROCEDURE — 0202U NFCT DS 22 TRGT SARS-COV-2: CPT

## 2024-08-04 PROCEDURE — 36415 COLL VENOUS BLD VENIPUNCTURE: CPT

## 2024-08-04 PROCEDURE — 1240000000 HC EMOTIONAL WELLNESS R&B

## 2024-08-04 PROCEDURE — 87651 STREP A DNA AMP PROBE: CPT

## 2024-08-04 PROCEDURE — 6370000000 HC RX 637 (ALT 250 FOR IP): Performed by: INTERNAL MEDICINE

## 2024-08-04 RX ORDER — AMOXICILLIN AND CLAVULANATE POTASSIUM 875; 125 MG/1; MG/1
1 TABLET, FILM COATED ORAL EVERY 12 HOURS SCHEDULED
Status: DISCONTINUED | OUTPATIENT
Start: 2024-08-04 | End: 2024-08-05

## 2024-08-04 RX ADMIN — DIVALPROEX SODIUM 250 MG: 250 TABLET, DELAYED RELEASE ORAL at 20:58

## 2024-08-04 RX ADMIN — DIVALPROEX SODIUM 250 MG: 250 TABLET, DELAYED RELEASE ORAL at 09:23

## 2024-08-04 RX ADMIN — RISPERIDONE 0.5 MG: 1 TABLET, FILM COATED ORAL at 09:23

## 2024-08-04 RX ADMIN — HYDROXYZINE PAMOATE 50 MG: 50 CAPSULE ORAL at 20:58

## 2024-08-04 RX ADMIN — BENZOCAINE AND MENTHOL 1 LOZENGE: 15; 3.6 LOZENGE ORAL at 20:59

## 2024-08-04 RX ADMIN — AMOXICILLIN AND CLAVULANATE POTASSIUM 1 TABLET: 875; 125 TABLET, FILM COATED ORAL at 20:58

## 2024-08-04 RX ADMIN — RISPERIDONE 1 MG: 1 TABLET, FILM COATED ORAL at 20:58

## 2024-08-04 RX ADMIN — ACETAMINOPHEN 650 MG: 325 TABLET ORAL at 20:56

## 2024-08-04 RX ADMIN — Medication 3 MG: at 20:58

## 2024-08-04 ASSESSMENT — PAIN SCALES - GENERAL
PAINLEVEL_OUTOF10: 8
PAINLEVEL_OUTOF10: 5
PAINLEVEL_OUTOF10: 8
PAINLEVEL_OUTOF10: 5

## 2024-08-04 ASSESSMENT — PAIN SCALES - WONG BAKER: WONGBAKER_NUMERICALRESPONSE: NO HURT

## 2024-08-04 ASSESSMENT — PAIN DESCRIPTION - DESCRIPTORS
DESCRIPTORS: SORE
DESCRIPTORS: SORE;OTHER (COMMENT)
DESCRIPTORS: SORE
DESCRIPTORS: SORE

## 2024-08-04 ASSESSMENT — PAIN - FUNCTIONAL ASSESSMENT
PAIN_FUNCTIONAL_ASSESSMENT: ACTIVITIES ARE NOT PREVENTED

## 2024-08-04 ASSESSMENT — PAIN DESCRIPTION - PAIN TYPE
TYPE: ACUTE PAIN
TYPE: ACUTE PAIN

## 2024-08-04 ASSESSMENT — PAIN DESCRIPTION - LOCATION
LOCATION: THROAT

## 2024-08-04 ASSESSMENT — PAIN DESCRIPTION - FREQUENCY: FREQUENCY: CONTINUOUS

## 2024-08-04 ASSESSMENT — PAIN DESCRIPTION - ORIENTATION
ORIENTATION: LEFT
ORIENTATION: RIGHT
ORIENTATION: LEFT
ORIENTATION: LEFT

## 2024-08-04 NOTE — PROGRESS NOTES
Patient resting quietly with eyes closed. No S/S of distress noted or observed. Prn medications vistaril given at this time. Will continue to monitor, provide needs and safe environment with continue rounding every 15 minutes.

## 2024-08-04 NOTE — PROGRESS NOTES
Patient approaches desk with compliant of left side throat soreness an scratchy.  Vitals taken as well as temperature. All with in normal limits.  Offered popsicle and Tylenol. Patient refused.   Patient just requesting  ice water.  Purposeful rounds continue

## 2024-08-04 NOTE — PROGRESS NOTES
BEHAVIORAL HEALTH FOLLOW-UP NOTE     8/4/2024     Patient was seen and examined in person, Chart reviewed   Patient's case discussed with staff/team    Chief Complaint: \" I have pain in my throat when I swallow the pain radiates into my ear and it is very painful\"    Interim History:     Micheal is out on the unit today he is a little pressured with underlying irritability on evaluation today.  He does not acknowledge the events from yesterday requiring him to be moved to this unit.  He is telling me today that he has pain and pressure in left side of his throat and that he cannot swallow and is really bothering him, he states when he swallows the pain radiates up into his ear and it is very painful.   he states that he ran a fever  yesterday.  He states that overall that he is physically not feeling well.  He is denying any suicidal or homicidal ideation intent or plan.  There is no signs of psychosis  Appetite:   [x] Normal/Unchanged  [] Increased  [] Decreased      Sleep:       [x] Normal/Unchanged  [] Fair       [] Poor              Energy:    [x] Normal/Unchanged  [] Increased  [] Decreased        SI [] Present  [x] Absent    HI  []Present  [x] Absent     Aggression:  [] yes  [x] no    Patient is [x] able  [] unable to CONTRACT FOR SAFETY     PAST MEDICAL/PSYCHIATRIC HISTORY:   Past Medical History:   Diagnosis Date    ADHD     Depression     Intussusception (HCC)     Oppositional defiant disorder        FAMILY/SOCIAL HISTORY:  History reviewed. No pertinent family history.  Social History     Socioeconomic History    Marital status: Single     Spouse name: Not on file    Number of children: Not on file    Years of education: Not on file    Highest education level: Not on file   Occupational History    Not on file   Tobacco Use    Smoking status: Every Day     Types: Cigarettes    Smokeless tobacco: Never   Vaping Use    Vaping Use: Every day    Substances: Nicotine   Substance and Sexual Activity    Alcohol

## 2024-08-04 NOTE — GROUP NOTE
Group Therapy Note    Date: 8/4/2024    Group Start Time: 0955  Group End Time: 1025  Group Topic: Cognitive Skills    [facility]    Tu Duarte MSW, DIANNA        Group Therapy Note    Attendees: 11       Patient's Goal: to participate in group discussion stress management tips.     Notes: pt was an active listener in group.     Status After Intervention:  Unchanged    Participation Level: Active Listener    Participation Quality: Appropriate and Attentive      Speech:  normal      Thought Process/Content: Logical      Affective Functioning: Congruent      Mood: anxious      Level of consciousness:  Alert      Response to Learning: Able to verbalize current knowledge/experience, Able to verbalize/acknowledge new learning, and Able to retain information      Endings: None Reported    Modes of Intervention: Education, Support, Socialization, Exploration, Clarifying, and Problem-solving      Discipline Responsible: /Counselor      Signature:  URSULA Mc LSW

## 2024-08-04 NOTE — PROGRESS NOTES
Patient declined verbal invitation to community meeting at this time.  Patient will continue to be provided with opportunities to enhance leisure skills/interests and/or coping mechanisms.

## 2024-08-04 NOTE — PLAN OF CARE
Problem: Anxiety  Goal: Will report anxiety at manageable levels  Description: INTERVENTIONS:  1. Administer medication as ordered  2. Teach and rehearse alternative coping skills  3. Provide emotional support with 1:1 interaction with staff  8/4/2024 0928 by Mera Serrano RN  Outcome: Progressing  8/4/2024 0039 by Linette Muñiz RN  Outcome: Not Progressing     Problem: Coping  Goal: Pt/Family able to verbalize concerns and demonstrate effective coping strategies  Description: INTERVENTIONS:  1. Assist patient/family to identify coping skills, available support systems and cultural and spiritual values  2. Provide emotional support, including active listening and acknowledgement of concerns of patient and caregivers  3. Reduce environmental stimuli, as able  4. Instruct patient/family in relaxation techniques, as appropriate  5. Assess for spiritual pain/suffering and initiate Spiritual Care, Psychosocial Clinical Specialist consults as needed  8/4/2024 0928 by Mera Serrano RN  Outcome: Progressing  8/4/2024 0039 by Linette Muñiz RN  Outcome: Not Progressing     Problem: Decision Making  Goal: Pt/Family able to effectively weigh alternatives and participate in decision making related to treatment and care  Description: INTERVENTIONS:  1. Determine when there are differences between patient's view, family's view, and healthcare provider's view of condition  2. Facilitate patient and family articulation of goals for care  3. Help patient and family identify pros/cons of alternative solutions  4. Provide information as requested by patient/family  5. Respect patient/family right to receive or not to receive information  6. Serve as a liaison between patient and family and health care team  7. Initiate Consults from Ethics, Palliative Care or initiate Family Care Conference as is appropriate  8/4/2024 0928 by Mera Serrano RN  Outcome: Progressing  8/4/2024 0039 by Linette Muñiz RN  Outcome:

## 2024-08-04 NOTE — PROGRESS NOTES
Patient declined verbal invitation to education group.  Patient will continue to be provided with opportunities to enhance leisure skills/interests and/or coping mechanisms.

## 2024-08-04 NOTE — PROGRESS NOTES
New consult for severe pain upon swallowing that radiates to the left ear. Dr. Laquita Campbell contacted via [employer].
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
## 2024-08-05 ENCOUNTER — APPOINTMENT (OUTPATIENT)
Dept: CT IMAGING | Age: 24
DRG: 885 | End: 2024-08-05
Payer: COMMERCIAL

## 2024-08-05 ENCOUNTER — HOSPITAL ENCOUNTER (INPATIENT)
Age: 24
LOS: 2 days | Discharge: HOME | DRG: 153 | End: 2024-08-07
Attending: STUDENT IN AN ORGANIZED HEALTH CARE EDUCATION/TRAINING PROGRAM | Admitting: STUDENT IN AN ORGANIZED HEALTH CARE EDUCATION/TRAINING PROGRAM
Payer: COMMERCIAL

## 2024-08-05 VITALS
HEIGHT: 68 IN | TEMPERATURE: 101 F | WEIGHT: 135 LBS | DIASTOLIC BLOOD PRESSURE: 61 MMHG | SYSTOLIC BLOOD PRESSURE: 114 MMHG | BODY MASS INDEX: 20.46 KG/M2 | HEART RATE: 117 BPM | RESPIRATION RATE: 16 BRPM | OXYGEN SATURATION: 97 %

## 2024-08-05 DIAGNOSIS — J03.90 TONSILLITIS: Primary | ICD-10-CM

## 2024-08-05 DIAGNOSIS — B27.90 INFECTIOUS MONONUCLEOSIS WITHOUT COMPLICATION, INFECTIOUS MONONUCLEOSIS DUE TO UNSPECIFIED ORGANISM: ICD-10-CM

## 2024-08-05 PROBLEM — F48.9 MENTAL HEALTH PROBLEM: Status: ACTIVE | Noted: 2024-08-05

## 2024-08-05 LAB
ATYPICAL LYMPHOCYTE ABSOLUTE COUNT: 0.88 K/UL (ref 0–0.46)
ATYPICAL LYMPHOCYTES: 6 % (ref 0–4)
BASOPHILS # BLD: 0 K/UL (ref 0–0.2)
BASOPHILS NFR BLD: 0 % (ref 0–2)
EKG ATRIAL RATE: 97 BPM
EKG P AXIS: 49 DEGREES
EKG P-R INTERVAL: 124 MS
EKG Q-T INTERVAL: 302 MS
EKG QRS DURATION: 80 MS
EKG QTC CALCULATION (BAZETT): 383 MS
EKG R AXIS: 76 DEGREES
EKG T AXIS: 55 DEGREES
EKG VENTRICULAR RATE: 97 BPM
EOSINOPHILS RELATIVE PERCENT: 0 % (ref 0–6)
ERYTHROCYTE [DISTWIDTH] IN BLOOD BY AUTOMATED COUNT: 12.9 % (ref 11.5–15)
HCT VFR BLD AUTO: 41.5 % (ref 37–54)
HETEROPH AB BLD QL IA: POSITIVE
HGB BLD-MCNC: 14.4 G/DL (ref 12.5–16.5)
LYMPHOCYTES NFR BLD: 7.06 K/UL (ref 1.5–4)
LYMPHOCYTES RELATIVE PERCENT: 49 % (ref 20–42)
MCH RBC QN AUTO: 30.4 PG (ref 26–35)
MCHC RBC AUTO-ENTMCNC: 34.7 G/DL (ref 32–34.5)
MCV RBC AUTO: 87.7 FL (ref 80–99.9)
MONOCYTES NFR BLD: 1.26 K/UL (ref 0.1–0.95)
MONOCYTES NFR BLD: 9 % (ref 2–12)
NEUTROPHILS NFR BLD: 37 % (ref 43–80)
NEUTS SEG NFR BLD: 5.3 K/UL (ref 1.8–7.3)
PLATELET, FLUORESCENCE: 111 K/UL (ref 130–450)
PMV BLD AUTO: 11.6 FL (ref 7–12)
RBC # BLD AUTO: 4.73 M/UL (ref 3.8–5.8)
RBC # BLD: NORMAL 10*6/UL
SPECIMEN SOURCE: NORMAL
STREP A, MOLECULAR: NEGATIVE
WBC OTHER # BLD: 14.5 K/UL (ref 4.5–11.5)

## 2024-08-05 PROCEDURE — 6370000000 HC RX 637 (ALT 250 FOR IP): Performed by: PSYCHIATRY & NEUROLOGY

## 2024-08-05 PROCEDURE — 6370000000 HC RX 637 (ALT 250 FOR IP): Performed by: STUDENT IN AN ORGANIZED HEALTH CARE EDUCATION/TRAINING PROGRAM

## 2024-08-05 PROCEDURE — 6370000000 HC RX 637 (ALT 250 FOR IP): Performed by: INTERNAL MEDICINE

## 2024-08-05 PROCEDURE — 96375 TX/PRO/DX INJ NEW DRUG ADDON: CPT

## 2024-08-05 PROCEDURE — 6370000000 HC RX 637 (ALT 250 FOR IP): Performed by: NURSE PRACTITIONER

## 2024-08-05 PROCEDURE — 70491 CT SOFT TISSUE NECK W/DYE: CPT

## 2024-08-05 PROCEDURE — 1200000000 HC SEMI PRIVATE

## 2024-08-05 PROCEDURE — 6360000002 HC RX W HCPCS: Performed by: NURSE PRACTITIONER

## 2024-08-05 PROCEDURE — 99239 HOSP IP/OBS DSCHRG MGMT >30: CPT | Performed by: NURSE PRACTITIONER

## 2024-08-05 PROCEDURE — 36415 COLL VENOUS BLD VENIPUNCTURE: CPT

## 2024-08-05 PROCEDURE — G0378 HOSPITAL OBSERVATION PER HR: HCPCS

## 2024-08-05 PROCEDURE — G0379 DIRECT REFER HOSPITAL OBSERV: HCPCS

## 2024-08-05 PROCEDURE — 2580000003 HC RX 258

## 2024-08-05 PROCEDURE — 85025 COMPLETE CBC W/AUTO DIFF WBC: CPT

## 2024-08-05 PROCEDURE — 96365 THER/PROPH/DIAG IV INF INIT: CPT

## 2024-08-05 PROCEDURE — 6360000004 HC RX CONTRAST MEDICATION: Performed by: RADIOLOGY

## 2024-08-05 PROCEDURE — 93010 ELECTROCARDIOGRAM REPORT: CPT | Performed by: INTERNAL MEDICINE

## 2024-08-05 PROCEDURE — 2580000003 HC RX 258: Performed by: NURSE PRACTITIONER

## 2024-08-05 PROCEDURE — 6360000002 HC RX W HCPCS

## 2024-08-05 PROCEDURE — 99232 SBSQ HOSP IP/OBS MODERATE 35: CPT | Performed by: STUDENT IN AN ORGANIZED HEALTH CARE EDUCATION/TRAINING PROGRAM

## 2024-08-05 RX ORDER — RISPERIDONE 1 MG/1
1 TABLET ORAL NIGHTLY
Status: DISCONTINUED | OUTPATIENT
Start: 2024-08-05 | End: 2024-08-07 | Stop reason: HOSPADM

## 2024-08-05 RX ORDER — HALOPERIDOL 5 MG/ML
5 INJECTION INTRAMUSCULAR EVERY 6 HOURS PRN
Status: CANCELLED | OUTPATIENT
Start: 2024-08-05

## 2024-08-05 RX ORDER — NICOTINE 21 MG/24HR
1 PATCH, TRANSDERMAL 24 HOURS TRANSDERMAL DAILY
Status: DISCONTINUED | OUTPATIENT
Start: 2024-08-06 | End: 2024-08-07

## 2024-08-05 RX ORDER — GUAIFENESIN 400 MG/1
400 TABLET ORAL 4 TIMES DAILY PRN
Status: DISCONTINUED | OUTPATIENT
Start: 2024-08-05 | End: 2024-08-07 | Stop reason: HOSPADM

## 2024-08-05 RX ORDER — HALOPERIDOL 5 MG/1
5 TABLET ORAL EVERY 6 HOURS PRN
Status: CANCELLED | OUTPATIENT
Start: 2024-08-05

## 2024-08-05 RX ORDER — DEXAMETHASONE SODIUM PHOSPHATE 4 MG/ML
10 INJECTION, SOLUTION INTRA-ARTICULAR; INTRALESIONAL; INTRAMUSCULAR; INTRAVENOUS; SOFT TISSUE EVERY 8 HOURS
Status: COMPLETED | OUTPATIENT
Start: 2024-08-05 | End: 2024-08-06

## 2024-08-05 RX ORDER — RISPERIDONE 1 MG/1
1 TABLET ORAL NIGHTLY
Status: CANCELLED | OUTPATIENT
Start: 2024-08-05

## 2024-08-05 RX ORDER — HALOPERIDOL 5 MG/ML
5 INJECTION INTRAMUSCULAR EVERY 6 HOURS PRN
Status: DISCONTINUED | OUTPATIENT
Start: 2024-08-05 | End: 2024-08-07 | Stop reason: HOSPADM

## 2024-08-05 RX ORDER — DIVALPROEX SODIUM 250 MG/1
250 TABLET, DELAYED RELEASE ORAL EVERY 12 HOURS SCHEDULED
Status: DISCONTINUED | OUTPATIENT
Start: 2024-08-05 | End: 2024-08-07 | Stop reason: HOSPADM

## 2024-08-05 RX ORDER — HALOPERIDOL 2 MG/1
5 TABLET ORAL EVERY 6 HOURS PRN
Status: DISCONTINUED | OUTPATIENT
Start: 2024-08-05 | End: 2024-08-07 | Stop reason: HOSPADM

## 2024-08-05 RX ORDER — BENZONATATE 100 MG/1
100 CAPSULE ORAL 3 TIMES DAILY PRN
Status: CANCELLED | OUTPATIENT
Start: 2024-08-05

## 2024-08-05 RX ORDER — RISPERIDONE 1 MG/1
0.5 TABLET ORAL DAILY
Status: CANCELLED | OUTPATIENT
Start: 2024-08-06

## 2024-08-05 RX ORDER — LANOLIN ALCOHOL/MO/W.PET/CERES
3 CREAM (GRAM) TOPICAL NIGHTLY PRN
Status: DISCONTINUED | OUTPATIENT
Start: 2024-08-05 | End: 2024-08-07 | Stop reason: HOSPADM

## 2024-08-05 RX ORDER — BENZONATATE 100 MG/1
100 CAPSULE ORAL 3 TIMES DAILY PRN
Status: DISCONTINUED | OUTPATIENT
Start: 2024-08-05 | End: 2024-08-05 | Stop reason: HOSPADM

## 2024-08-05 RX ORDER — DIVALPROEX SODIUM 250 MG/1
250 TABLET, DELAYED RELEASE ORAL EVERY 12 HOURS SCHEDULED
Status: CANCELLED | OUTPATIENT
Start: 2024-08-05

## 2024-08-05 RX ORDER — ACETAMINOPHEN 325 MG/1
650 TABLET ORAL EVERY 6 HOURS PRN
Status: DISCONTINUED | OUTPATIENT
Start: 2024-08-05 | End: 2024-08-07 | Stop reason: HOSPADM

## 2024-08-05 RX ORDER — HYDROXYZINE PAMOATE 50 MG/1
50 CAPSULE ORAL 3 TIMES DAILY PRN
Status: CANCELLED | OUTPATIENT
Start: 2024-08-05

## 2024-08-05 RX ORDER — RISPERIDONE 0.5 MG/1
0.5 TABLET ORAL DAILY
Status: DISCONTINUED | OUTPATIENT
Start: 2024-08-06 | End: 2024-08-07 | Stop reason: HOSPADM

## 2024-08-05 RX ORDER — BENZONATATE 100 MG/1
100 CAPSULE ORAL 3 TIMES DAILY PRN
Status: DISCONTINUED | OUTPATIENT
Start: 2024-08-05 | End: 2024-08-07 | Stop reason: HOSPADM

## 2024-08-05 RX ORDER — MAGNESIUM HYDROXIDE/ALUMINUM HYDROXICE/SIMETHICONE 120; 1200; 1200 MG/30ML; MG/30ML; MG/30ML
30 SUSPENSION ORAL PRN
Status: DISCONTINUED | OUTPATIENT
Start: 2024-08-05 | End: 2024-08-07 | Stop reason: HOSPADM

## 2024-08-05 RX ORDER — MAGNESIUM HYDROXIDE/ALUMINUM HYDROXICE/SIMETHICONE 120; 1200; 1200 MG/30ML; MG/30ML; MG/30ML
30 SUSPENSION ORAL PRN
Status: CANCELLED | OUTPATIENT
Start: 2024-08-05

## 2024-08-05 RX ORDER — ACETAMINOPHEN 325 MG/1
650 TABLET ORAL EVERY 6 HOURS PRN
Status: CANCELLED | OUTPATIENT
Start: 2024-08-05

## 2024-08-05 RX ORDER — SODIUM CHLORIDE 0.9 % (FLUSH) 0.9 %
SYRINGE (ML) INJECTION
Status: DISPENSED
Start: 2024-08-05 | End: 2024-08-05

## 2024-08-05 RX ORDER — SODIUM CHLORIDE 0.9 % (FLUSH) 0.9 %
SYRINGE (ML) INJECTION
Status: COMPLETED
Start: 2024-08-05 | End: 2024-08-05

## 2024-08-05 RX ORDER — HYDROXYZINE PAMOATE 50 MG/1
50 CAPSULE ORAL 3 TIMES DAILY PRN
Status: DISCONTINUED | OUTPATIENT
Start: 2024-08-05 | End: 2024-08-07 | Stop reason: HOSPADM

## 2024-08-05 RX ORDER — GUAIFENESIN 400 MG/1
400 TABLET ORAL 4 TIMES DAILY PRN
Status: DISCONTINUED | OUTPATIENT
Start: 2024-08-05 | End: 2024-08-05 | Stop reason: HOSPADM

## 2024-08-05 RX ORDER — GUAIFENESIN 400 MG/1
400 TABLET ORAL 4 TIMES DAILY PRN
Status: CANCELLED | OUTPATIENT
Start: 2024-08-05

## 2024-08-05 RX ORDER — LANOLIN ALCOHOL/MO/W.PET/CERES
3 CREAM (GRAM) TOPICAL NIGHTLY PRN
Status: CANCELLED | OUTPATIENT
Start: 2024-08-05

## 2024-08-05 RX ORDER — NICOTINE 21 MG/24HR
1 PATCH, TRANSDERMAL 24 HOURS TRANSDERMAL DAILY
Status: CANCELLED | OUTPATIENT
Start: 2024-08-06

## 2024-08-05 RX ADMIN — RISPERIDONE 0.5 MG: 1 TABLET, FILM COATED ORAL at 09:18

## 2024-08-05 RX ADMIN — AMOXICILLIN AND CLAVULANATE POTASSIUM 1 TABLET: 875; 125 TABLET, FILM COATED ORAL at 09:18

## 2024-08-05 RX ADMIN — DIVALPROEX SODIUM 250 MG: 250 TABLET, DELAYED RELEASE ORAL at 09:19

## 2024-08-05 RX ADMIN — RISPERIDONE 1 MG: 1 TABLET, FILM COATED ORAL at 21:03

## 2024-08-05 RX ADMIN — DIVALPROEX SODIUM 250 MG: 250 TABLET, DELAYED RELEASE ORAL at 21:03

## 2024-08-05 RX ADMIN — AMPICILLIN SODIUM AND SULBACTAM SODIUM 3000 MG: 2; 1 INJECTION, POWDER, FOR SOLUTION INTRAMUSCULAR; INTRAVENOUS at 23:03

## 2024-08-05 RX ADMIN — SODIUM CHLORIDE, PRESERVATIVE FREE: 5 INJECTION INTRAVENOUS at 11:22

## 2024-08-05 RX ADMIN — BENZOCAINE AND MENTHOL 1 LOZENGE: 15; 3.6 LOZENGE ORAL at 09:20

## 2024-08-05 RX ADMIN — DEXAMETHASONE SODIUM PHOSPHATE 10 MG: 4 INJECTION, SOLUTION INTRAMUSCULAR; INTRAVENOUS at 21:03

## 2024-08-05 RX ADMIN — ACETAMINOPHEN 650 MG: 325 TABLET ORAL at 21:03

## 2024-08-05 RX ADMIN — Medication 3 MG: at 21:03

## 2024-08-05 RX ADMIN — Medication 1 SPRAY: at 16:53

## 2024-08-05 RX ADMIN — IOPAMIDOL 75 ML: 755 INJECTION, SOLUTION INTRAVENOUS at 13:29

## 2024-08-05 RX ADMIN — Medication 1 SPRAY: at 23:05

## 2024-08-05 RX ADMIN — ACETAMINOPHEN 650 MG: 325 TABLET ORAL at 10:16

## 2024-08-05 ASSESSMENT — PAIN DESCRIPTION - DESCRIPTORS
DESCRIPTORS: ACHING;DISCOMFORT;DULL
DESCRIPTORS: ACHING;DISCOMFORT;SORE
DESCRIPTORS: SORE
DESCRIPTORS: SORE
DESCRIPTORS: SORE;THROBBING;DISCOMFORT
DESCRIPTORS: SORE

## 2024-08-05 ASSESSMENT — PAIN SCALES - GENERAL
PAINLEVEL_OUTOF10: 7
PAINLEVEL_OUTOF10: 6
PAINLEVEL_OUTOF10: 7
PAINLEVEL_OUTOF10: 6
PAINLEVEL_OUTOF10: 6

## 2024-08-05 ASSESSMENT — PAIN - FUNCTIONAL ASSESSMENT: PAIN_FUNCTIONAL_ASSESSMENT: ACTIVITIES ARE NOT PREVENTED

## 2024-08-05 ASSESSMENT — PAIN DESCRIPTION - LOCATION
LOCATION: THROAT

## 2024-08-05 ASSESSMENT — PAIN DESCRIPTION - ORIENTATION
ORIENTATION: LEFT
ORIENTATION: LEFT

## 2024-08-05 NOTE — GROUP NOTE
Group Therapy Note    Date: 8/5/2024    Group Start Time: 1405  Group End Time: 1445  Group Topic: Cognitive Skills    [facility]    Shelbie Peoples MSW, LSW        Group Therapy Note    Attendees: 12       Patient's Goal:  Pt will be able to discuss tips for setting boundaries and identify their current boundaries.     Notes:  Pt was an active participant in group discussion.     Status After Intervention:  Improved    Participation Level: Active Listener and Interactive    Participation Quality: Appropriate, Attentive, Sharing, and Supportive      Speech:  normal      Thought Process/Content: Logical  Linear      Affective Functioning: Congruent      Mood: euthymic      Level of consciousness:  Alert, Oriented x4, and Attentive      Response to Learning: Able to verbalize current knowledge/experience, Able to verbalize/acknowledge new learning, Able to retain information, Capable of insight, and Progressing to goal      Endings: None Reported    Modes of Intervention: Education, Support, Socialization, Exploration, Clarifying, and Problem-solving      Discipline Responsible: /Counselor      Signature:  URSULA Quiroz LSW

## 2024-08-05 NOTE — PROGRESS NOTES
Patient resting quietly with eyes closed. No S/S of distress noted or observed. Prn medications vistaril , Melatonin given at this time. Will continue to monitor, provide needs and safe environment with continue rounding every 15 minutes.

## 2024-08-05 NOTE — PLAN OF CARE
Problem: Anxiety  Goal: Will report anxiety at manageable levels  Description: INTERVENTIONS:  1. Administer medication as ordered  2. Teach and rehearse alternative coping skills  3. Provide emotional support with 1:1 interaction with staff  8/5/2024 0841 by Debbi Cornejo RN  Outcome: Progressing  8/4/2024 2152 by Linette Muñiz RN  Outcome: Progressing     Problem: Coping  Goal: Pt/Family able to verbalize concerns and demonstrate effective coping strategies  Description: INTERVENTIONS:  1. Assist patient/family to identify coping skills, available support systems and cultural and spiritual values  2. Provide emotional support, including active listening and acknowledgement of concerns of patient and caregivers  3. Reduce environmental stimuli, as able  4. Instruct patient/family in relaxation techniques, as appropriate  5. Assess for spiritual pain/suffering and initiate Spiritual Care, Psychosocial Clinical Specialist consults as needed  8/5/2024 0841 by Debbi Cornejo RN  Outcome: Progressing  8/4/2024 2152 by Linette Muñiz RN  Outcome: Progressing     Problem: Decision Making  Goal: Pt/Family able to effectively weigh alternatives and participate in decision making related to treatment and care  Description: INTERVENTIONS:  1. Determine when there are differences between patient's view, family's view, and healthcare provider's view of condition  2. Facilitate patient and family articulation of goals for care  3. Help patient and family identify pros/cons of alternative solutions  4. Provide information as requested by patient/family  5. Respect patient/family right to receive or not to receive information  6. Serve as a liaison between patient and family and health care team  7. Initiate Consults from Ethics, Palliative Care or initiate Family Care Conference as is appropriate  8/4/2024 2152 by Linette Muñiz RN  Outcome: Progressing     Problem: Behavior  Goal: Pt/Family maintain appropriate
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
## 2024-08-05 NOTE — PLAN OF CARE
Problem: Depression/Self Harm  Goal: Effect of psychiatric condition will be minimized and patient will be protected from self harm  Description: INTERVENTIONS:  1. Assess impact of patient's symptoms on level of functioning, self care needs and offer support as indicated  2. Assess patient/family knowledge of depression, impact on illness and need for teaching  3. Provide emotional support, presence and reassurance  4. Assess for possible suicidal thoughts or ideation. If patient expresses suicidal thoughts or statements do not leave alone, initiate Suicide Precautions, move to a room close to the nursing station and obtain sitter  5. Initiate consults as appropriate with Mental Health Professional, Spiritual Care, Psychosocial CNS, and consider a recommendation to the LIP for a Psychiatric Consultation  Outcome: Not Progressing  Flowsheets (Taken 8/4/2024 2054)  Effect of psychiatric condition will be minimized and patient will be protected from self harm: Provide emotional support, presence and reassurance

## 2024-08-05 NOTE — TRANSITION OF CARE
Not Detected    SARS-CoV-2, PCR Not Detected Not Detected    Human Metapneumovirus PCR Not Detected Not Detected    Rhino/Enterovirus PCR Not Detected Not Detected    Influenza A by PCR Not Detected Not Detected    Influenza B by PCR Not Detected Not Detected    Parainfluenza 1 PCR Not Detected Not Detected    Parainfluenza 2 PCR Not Detected Not Detected    Parainfluenza 3 PCR Not Detected Not Detected    Parainfluenza 4 PCR Not Detected Not Detected    Resp Syncytial Virus PCR Not Detected Not Detected    Bordetella parapertussis by PCR Not Detected Not Detected    B Pertussis by PCR Not Detected Not Detected    Chlamydia pneumoniae By PCR Not Detected Not Detected    Mycoplasma pneumo by PCR Not Detected Not Detected   Rapid Strep Screen    Specimen: Throat   Result Value Ref Range    Source .THROAT SWAB     Strep A, Molecular NEGATIVE NEGATIVE   CBC with Auto Differential   Result Value Ref Range    WBC 10.5 4.5 - 11.5 k/uL    RBC 4.81 3.80 - 5.80 m/uL    Hemoglobin 14.5 12.5 - 16.5 g/dL    Hematocrit 41.9 37.0 - 54.0 %    MCV 87.1 80.0 - 99.9 fL    MCH 30.1 26.0 - 35.0 pg    MCHC 34.6 (H) 32.0 - 34.5 g/dL    RDW 12.8 11.5 - 15.0 %    Platelets 179 130 - 450 k/uL    MPV 10.7 7.0 - 12.0 fL    Neutrophils % 47 43.0 - 80.0 %    Lymphocytes % 29 20.0 - 42.0 %    Monocytes % 6 2.0 - 12.0 %    Eosinophils % 0 0 - 6 %    Basophils % 1 0.0 - 2.0 %    Atypical Lymphocytes 17 (H) 0 - 4 %    Neutrophils Absolute 4.93 1.80 - 7.30 k/uL    Lymphocytes Absolute 3.01 1.50 - 4.00 k/uL    Monocytes Absolute 0.64 0.10 - 0.95 k/uL    Eosinophils Absolute 0.00 (L) 0.05 - 0.50 k/uL    Basophils Absolute 0.09 0.00 - 0.20 k/uL    Atypical Lymphocytes Absolute 1.83 (H) 0.00 - 0.46 k/uL    RBC Morphology Normal    Comprehensive Metabolic Panel   Result Value Ref Range    Sodium 134 132 - 146 mmol/L    Potassium 4.7 3.5 - 5.0 mmol/L    Chloride 99 98 - 107 mmol/L    CO2 24 22 - 29 mmol/L    Anion Gap 11 7 - 16 mmol/L    Glucose 97 74 - 99

## 2024-08-05 NOTE — PROGRESS NOTES
BEHAVIORAL HEALTH FOLLOW-UP NOTE     8/5/2024     Patient was seen and examined in person, Chart reviewed   Patient's case discussed with staff/team    Chief Complaint: \" I don't feel well\"     Interim History:     Micheal is resting in his room, he states that he is cold, and is asking that the heat be turned up in his room.  He is surprised to learn he has mono.  He states that overall that he is physically not feeling well.  He is denying any suicidal or homicidal ideation intent or plan.  There is no signs of psychosis. He is taking his medications.       Appetite:   [x] Normal/Unchanged  [] Increased  [] Decreased      Sleep:       [x] Normal/Unchanged  [] Fair       [] Poor              Energy:    [x] Normal/Unchanged  [] Increased  [] Decreased        SI [] Present  [x] Absent    HI  []Present  [x] Absent     Aggression:  [] yes  [x] no    Patient is [x] able  [] unable to CONTRACT FOR SAFETY     PAST MEDICAL/PSYCHIATRIC HISTORY:   Past Medical History:   Diagnosis Date    ADHD     Depression     Intussusception (HCC)     Oppositional defiant disorder        FAMILY/SOCIAL HISTORY:  History reviewed. No pertinent family history.  Social History     Socioeconomic History    Marital status: Single     Spouse name: Not on file    Number of children: Not on file    Years of education: Not on file    Highest education level: Not on file   Occupational History    Not on file   Tobacco Use    Smoking status: Every Day     Types: Cigarettes    Smokeless tobacco: Never   Vaping Use    Vaping Use: Every day    Substances: Nicotine   Substance and Sexual Activity    Alcohol use: Never    Drug use: Yes    Sexual activity: Never   Other Topics Concern    Not on file   Social History Narrative    Not on file     Social Determinants of Health     Financial Resource Strain: Not on file   Food Insecurity: Food Insecurity Present (8/3/2024)    Hunger Vital Sign     Worried About Running Out of Food in the Last Year: Sometimes

## 2024-08-05 NOTE — PROGRESS NOTES
Rapid strep swab completed and sent. Left tonsil swollen with white plaque, patient verbalizes soreness and fear of eating.  Will await result for further orders.

## 2024-08-05 NOTE — BH NOTE
Patient denies SI/HI/hallucinations. Patient states his anxiety is 0 and his depression is 5/10 due to being isolated in his room due to his illness. Patient states his throat pain is 7/10 this morning. Patient appears anxious regarding the IV for the CT scan today, stating that they already tried twice and blew his veins both times. Patient resting in bed awaiting CT.

## 2024-08-05 NOTE — PROGRESS NOTES
[facility]  Day 3 Interdisciplinary Treatment Plan NOTE    Review Date & Time: 08/05/2024 0900    Admission Type:   Admission Type: Voluntary    Reason for admission:  Reason for Admission: \"to get help, before I did something I might regret, thoughts to jump off bridge\"  Estimated Length of Stay: 5-7 days  Estimated Discharge Date Update: to be determined by physician    PATIENT STRENGTHS:  Patient Strengths    Patient Strengths and Limitations:Limitations: Difficult relationships / poor social skills, Multiple barriers to leisure interests, Inappropriate/potentially harmful leisure interests  Addictive Behavior:Addictive Behavior  In the Past 3 Months, Have You Felt or Has Someone Told You That You Have a Problem With  : None  Medical Problems:  Past Medical History:   Diagnosis Date    ADHD     Depression     Intussusception (HCC)     Oppositional defiant disorder        Risk:  Fall Risk   Kael Scale Kael Scale Score: 21  BVC    Change in scores no Changes to plan of Care no    Status EXAM:   Mental Status and Behavioral Exam  Normal: No  Level of Assistance: Independent/Self  Facial Expression: Worried  Affect: Congruent  Level of Consciousness: Alert  Frequency of Checks: 4 times per hour, close  Mood:Normal: No  Mood: Depressed, Anxious  Motor Activity:Normal: Yes  Eye Contact: Good  Observed Behavior: Cooperative  Sexual Misconduct History: Current - no  Preception: [place] to person, [place] to time, [place] to place, [place] to situation  Attention:Normal: Yes  Thought Processes: Circumstantial  Thought Content:Normal: No  Thought Content: Preoccupations  Depression Symptoms: Change in energy level  Anxiety Symptoms: Generalized  Opal Symptoms: No problems reported or observed.  Hallucinations: None  Delusions: No  Memory:Normal: Yes  Insight and Judgment: No  Insight and Judgment: Poor judgment    Daily Assessment Last Entry:   Daily Sleep (WDL): Exceptions to WDL (late admission)

## 2024-08-05 NOTE — PROGRESS NOTES
[facility] Hospitalist consult progress Note    SYNOPSIS: Patient admitted on 2024 for Bipolar affective disorder, mixed, severe (HCC)  This is a 23-year-old male with no apparent medical history, history of mood disorder who presented for psych eval.  He had been off his medication for past month.  He was admitted to psych.  Intermittent was consulted for sore throat radiating to left ear and fever.  He stated that his symptoms has been going on since past few days and has been getting worse.  He was febrile 1 1.7 °F.  His left tonsil is erythematous and has white coating.  Streptococcus negative.  Infectious mononucleosis was positive.  Viral panel negative.  ENT, ID has been consulted.  He was started on Augmentin.  CT soft tissue neck-large bilateral palatine tonsils suggestive of tonsillitis with suspected developing peritonsillar abscess on the left.  Multiple enlarged anterior cervical chain lymph nodes more significant on the left measuring up to 3.1 cm as well as prominent bilateral submandibular space lymph nodes.    SUBJECTIVE:  Stable overnight. No other overnight issues reported.   Patient seen and examined  Records reviewed.         Temp (24hrs), Av.9 °F (37.7 °C), Min:98.4 °F (36.9 °C), Max:102.2 °F (39 °C)    DIET: ADULT DIET; Regular; Safety Tray; Safety Tray (Disposables)  CODE: Full Code  No intake or output data in the 24 hours ending 24 1534    Review of Systems  All bolded are positive; please see HPI  General:  Fever, chills, diaphoresis, fatigue, malaise, night sweats, weight loss  Psychological:  Anxiety, disorientation, hallucinations.  ENT:  Epistaxis, headaches, vertigo, visual changes.  Sore throat, nasal congestion  Cardiovascular:  Chest pain, irregular heartbeats, palpitations, paroxysmal nocturnal dyspnea.  Respiratory:  Shortness of breath, coughing, sputum production, hemoptysis, wheezing, orthopnea.  Gastrointestinal:  Nausea, vomiting, diarrhea, heartburn,

## 2024-08-05 NOTE — CARE COORDINATION
Sw called [facility] to see what their policy would be regarding pt having mono. They stated that because pt would have to share a bathroom with other clients they are going to have to ask that pt not go to the [facility] until he is over mono. They stated that he is welcome there though and they requested that he have his psychiatric medications when he is there.

## 2024-08-05 NOTE — PROGRESS NOTES
Patient was isolative to his room,awake stating that he is not coming out of room due to his throat, awaiting results.  Alert and oriented x 4.  Affect is blunt.  Denies suicidal,homicidal  or auditory,visual hallucinations.  Anxiety 2 do to awaiting results of labs and going to have upcoming IV placed. \"I hate needles.\"  Depression 5 do to my throat hurts and not knowing what it is.  Patient verbalizes afraid to eat because makes his throat hurt.  Verbalizes sleeping all day.  Compliant with all evening medications.  No groups attended.  Purposeful rounds continue.

## 2024-08-05 NOTE — CARE COORDINATION
Che called the probation office at [facility] [phone]. Che left them a voicemail with call back information.

## 2024-08-05 NOTE — PROGRESS NOTES
[facility]  Discharge Note    Pt discharged with followings belongings:   Dental Appliances: None  Vision - Corrective Lenses: None  Hearing Aid: None  Jewelry: None  Body Piercings Removed: N/A  Clothing: Footwear, Hat, Pants, Shirt, Shorts, Undergarments  Other Valuables: Wallet, Credit/Debit Card, Lighter/Matches   Valuables returned to patient. Patient educated on aftercare instructions: Yes  Information faxed to N/A by N/A  at 4:37 PM .Patient verbalize understanding of AVS:  Yes.    Status EXAM upon discharge:  Mental Status and Behavioral Exam  Normal: No  Level of Assistance: Independent/Self  Facial Expression: Worried  Affect: Congruent  Level of Consciousness: Alert  Frequency of Checks: 4 times per hour, close  Mood:Normal: No  Mood: Depressed, Anxious  Motor Activity:Normal: Yes  Eye Contact: Good  Observed Behavior: Cooperative  Sexual Misconduct History: Current - no  Preception: [place] to person, [place] to time, [place] to place, [place] to situation  Attention:Normal: Yes  Thought Processes: Circumstantial  Thought Content:Normal: No  Thought Content: Preoccupations  Depression Symptoms: Change in energy level  Anxiety Symptoms: Generalized  Opal Symptoms: No problems reported or observed.  Hallucinations: None  Delusions: No  Memory:Normal: Yes  Insight and Judgment: No  Insight and Judgment: Poor judgment    Tobacco Screening:  Practical Counseling, on admission, mariaelena X, if applicable and completed (first 3 are required if patient doesn't refuse):            (X) Recognizing danger situations (included triggers and roadblocks)                    (X) Coping skills (new ways to manage stress,relaxation techniques, changing routine, distraction)                                                           (X) Basic information about quitting (benefits of quitting, techniques in how to quit, available resources  ( ) [facility]

## 2024-08-05 NOTE — PROGRESS NOTES
4 Eyes Skin Assessment     NAME:  Micheal Branch  YOB: 2000  MEDICAL RECORD NUMBER:  [id number]    The patient is being assessed for  Admission    I agree that at least one RN has performed a thorough Head to Toe Skin Assessment on the patient. ALL assessment sites listed below have been assessed.      Areas assessed by both nurses:    Head, Face, Ears, Shoulders, Back, Chest, Arms, Elbows, Hands, Sacrum. Buttock, Coccyx, Ischium, Legs. Feet and Heels, Under Medical Devices , and Other          Does the Patient have a Wound? No noted wound(s)       Kael Prevention initiated by RN: No  Wound Care Orders initiated by RN: No    Pressure Injury (Stage 3,4, Unstageable, DTI, NWPT, and Complex wounds) if present, place Wound referral order by RN under : No    New Ostomies, if present place, Ostomy referral order under : No     Nurse 1 eSignature: Electronically signed by Tamara Garcia RN on 8/5/24 at 7:06 PM EDT    **SHARE this note so that the co-signing nurse can place an eSignature**    Nurse 2 eSignature: Electronically signed by Kathy Devine RN on 8/5/24 at 7:08 PM EDT

## 2024-08-05 NOTE — CONSULTS
Substances: Nicotine   Substance and Sexual Activity    Alcohol use: Never    Drug use: Yes    Sexual activity: Never     Social Determinants of Health     Food Insecurity: Food Insecurity Present (8/3/2024)    Hunger Vital Sign     Worried About Running Out of Food in the Last Year: Sometimes true     Ran Out of Food in the Last Year: Sometimes true   Transportation Needs: No Transportation Needs (8/3/2024)    PRAPARE - Transportation     Lack of Transportation (Medical): No     Lack of Transportation (Non-Medical): No   Housing Stability: High Risk (8/3/2024)    Housing Stability Vital Sign     Unable to Pay for Housing in the Last Year: No     Number of Places Lived in the Last Year: 7     Unstable Housing in the Last Year: Yes     Tobacco: No  Alcohol: No  Pets: No  Travel: No    Family History:   History reviewed. No pertinent family history.. Otherwise non-pertinent to the chief complaint.    REVIEW OF SYSTEMS:    CONSTITUTIONAL:  No chills, fevers or night sweats. No loss of weight.  EYES:  No double vision or drainage from eyes, ears or throat.  HEENT:  No neck stiffness. No dysphagia. No drainage from eyes, ears or throat  RESPIRATORY:  No cough, productive sputum or hemoptysis.   CARDIOVASCULAR:  No chest pain, palpitations, orthopnea or dyspnea on exertion.  GASTROINTESTINAL:  No nausea, vomiting, diarrhea or constipation or hematochezia   GENITOURINARY:  No frequency burning dysuria or hematuria.  INTEGUMENT/BREAST:  No rash or breast masses.  HEMATOLOGIC/LYMPHATIC:  No lymphadenopathy or blood dyscrasics.   ALLERGIC/IMMUNOLOGIC:  No anaphylaxis.   ENDOCRINE:  No polyuria or polydipsia or temperature intolerance.    MUSCULOSKELETAL:  No myalgia or arthralgia.  Full ROM.  NEUROLOGICAL:  No focal motor sensory deficit.  BEHAVIOR/PSYCH:  No psychosis.     PHYSICAL EXAM:    Vitals:    /61   Pulse (!) 117   Temp (!) 101 °F (38.3 °C) (Temporal)   Resp 16   Ht 1.727 m (5' 7.99\")   Wt 61.2 kg (135 lb) 
extremities normal, atraumatic, no cyanosis or edema  Musculokeletal:  no joint swelling, no muscle tenderness. ROM normal in all joints of extremities.   Neurologic:  mental status A&Ox3, thought content appropriate; CN II-XII grossly intact; sensation intact, motor strength 5/5 globally; no slurred speech    Laboratory Data  Recent Results (from the past 24 hour(s))   Lipid Panel    Collection Time: 08/04/24  6:26 AM   Result Value Ref Range    Cholesterol, Total 114 <200 mg/dL    HDL 33 (L) >40 mg/dL    LDL Cholesterol 70 <100 mg/dL    Triglycerides 57 <150 mg/dL    VLDL 11 mg/dL   Hemoglobin A1C    Collection Time: 08/04/24  6:26 AM   Result Value Ref Range    Hemoglobin A1C 5.3 4.0 - 5.6 %       Imaging  No results found.        Assessment and Plan  Patient is a 23 y.o. male who presented with mood disorder.   The active problem list is as follows:    Principal Problem:    Bipolar affective disorder, mixed, severe ([facility])  Active Problems:    Cannabis abuse    Cluster B personality disorder ([facility])  Resolved Problems:    Severe manic bipolar 1 disorder with psychotic behavior ([facility])        Mood disorder- per admitting  Sore throat, ear pain, fever- Check mono, strep, viral panel. CT soft tissue neck. Augmentin    Routine labs in the morning.  DVT prophylaxis.  Please see orders for further management and care.  If there are any questions after 7am, [facility].      Additional work up or/and treatment plan may be added today or thereafter based on clinical progression. I am managing admission portion of patient care. Some medical issues are handled by other specialists. Additional work up and treatment should be done by my colleague hospitalist who assumes care.       Laquita Campbell,     1:47 PM  8/4/2024

## 2024-08-06 PROBLEM — B27.09 GAMMAHERPESVIRAL MONONUCLEOSIS WITH OTHER COMPLICATIONS: Status: ACTIVE | Noted: 2024-08-06

## 2024-08-06 PROCEDURE — 6370000000 HC RX 637 (ALT 250 FOR IP): Performed by: NURSE PRACTITIONER

## 2024-08-06 PROCEDURE — 99235 HOSP IP/OBS SAME DATE MOD 70: CPT | Performed by: STUDENT IN AN ORGANIZED HEALTH CARE EDUCATION/TRAINING PROGRAM

## 2024-08-06 PROCEDURE — 6360000002 HC RX W HCPCS: Performed by: NURSE PRACTITIONER

## 2024-08-06 PROCEDURE — 96376 TX/PRO/DX INJ SAME DRUG ADON: CPT

## 2024-08-06 PROCEDURE — 96366 THER/PROPH/DIAG IV INF ADDON: CPT

## 2024-08-06 PROCEDURE — 6370000000 HC RX 637 (ALT 250 FOR IP): Performed by: INTERNAL MEDICINE

## 2024-08-06 PROCEDURE — 6360000002 HC RX W HCPCS

## 2024-08-06 PROCEDURE — G0378 HOSPITAL OBSERVATION PER HR: HCPCS

## 2024-08-06 PROCEDURE — 99222 1ST HOSP IP/OBS MODERATE 55: CPT | Performed by: OTOLARYNGOLOGY

## 2024-08-06 PROCEDURE — 2580000003 HC RX 258: Performed by: NURSE PRACTITIONER

## 2024-08-06 PROCEDURE — 1200000000 HC SEMI PRIVATE

## 2024-08-06 RX ORDER — DIVALPROEX SODIUM 250 MG/1
250 TABLET, DELAYED RELEASE ORAL EVERY 12 HOURS SCHEDULED
Qty: 60 TABLET | Refills: 0 | Status: SHIPPED | OUTPATIENT
Start: 2024-08-06 | End: 2024-09-05

## 2024-08-06 RX ORDER — AMOXICILLIN AND CLAVULANATE POTASSIUM 875; 125 MG/1; MG/1
1 TABLET, FILM COATED ORAL EVERY 12 HOURS SCHEDULED
Qty: 28 TABLET | Refills: 0 | Status: SHIPPED | OUTPATIENT
Start: 2024-08-06 | End: 2024-08-20

## 2024-08-06 RX ORDER — GUAIFENESIN 400 MG/1
400 TABLET ORAL 4 TIMES DAILY PRN
Qty: 56 TABLET | Refills: 0 | Status: SHIPPED | OUTPATIENT
Start: 2024-08-06

## 2024-08-06 RX ORDER — MAGNESIUM HYDROXIDE/ALUMINUM HYDROXICE/SIMETHICONE 120; 1200; 1200 MG/30ML; MG/30ML; MG/30ML
30 SUSPENSION ORAL PRN
Qty: 355 ML | Refills: 0 | Status: SHIPPED | OUTPATIENT
Start: 2024-08-06

## 2024-08-06 RX ORDER — RISPERIDONE 0.5 MG/1
0.5 TABLET ORAL DAILY
Qty: 60 TABLET | Refills: 3 | Status: SHIPPED | OUTPATIENT
Start: 2024-08-07

## 2024-08-06 RX ORDER — AMOXICILLIN AND CLAVULANATE POTASSIUM 875; 125 MG/1; MG/1
1 TABLET, FILM COATED ORAL EVERY 12 HOURS SCHEDULED
Status: DISCONTINUED | OUTPATIENT
Start: 2024-08-06 | End: 2024-08-07 | Stop reason: HOSPADM

## 2024-08-06 RX ORDER — BENZONATATE 100 MG/1
100 CAPSULE ORAL 3 TIMES DAILY PRN
Qty: 30 CAPSULE | Refills: 0 | Status: SHIPPED | OUTPATIENT
Start: 2024-08-06 | End: 2024-08-16

## 2024-08-06 RX ORDER — POLYETHYLENE GLYCOL 3350 17 G
2 POWDER IN PACKET (EA) ORAL
Qty: 100 EACH | Refills: 3 | Status: SHIPPED | OUTPATIENT
Start: 2024-08-06

## 2024-08-06 RX ORDER — RISPERIDONE 1 MG/1
1 TABLET ORAL NIGHTLY
Qty: 60 TABLET | Refills: 3 | Status: SHIPPED | OUTPATIENT
Start: 2024-08-06

## 2024-08-06 RX ORDER — POLYETHYLENE GLYCOL 3350 17 G
2 POWDER IN PACKET (EA) ORAL
Status: DISCONTINUED | OUTPATIENT
Start: 2024-08-06 | End: 2024-08-07 | Stop reason: SDUPTHER

## 2024-08-06 RX ADMIN — AMPICILLIN SODIUM AND SULBACTAM SODIUM 3000 MG: 2; 1 INJECTION, POWDER, FOR SOLUTION INTRAMUSCULAR; INTRAVENOUS at 11:37

## 2024-08-06 RX ADMIN — DIVALPROEX SODIUM 250 MG: 250 TABLET, DELAYED RELEASE ORAL at 20:26

## 2024-08-06 RX ADMIN — RISPERIDONE 1 MG: 1 TABLET, FILM COATED ORAL at 20:26

## 2024-08-06 RX ADMIN — Medication 3 MG: at 20:26

## 2024-08-06 RX ADMIN — DIVALPROEX SODIUM 250 MG: 250 TABLET, DELAYED RELEASE ORAL at 10:08

## 2024-08-06 RX ADMIN — DEXAMETHASONE SODIUM PHOSPHATE 10 MG: 4 INJECTION, SOLUTION INTRAMUSCULAR; INTRAVENOUS at 11:39

## 2024-08-06 RX ADMIN — RISPERIDONE 0.5 MG: 1 TABLET, FILM COATED ORAL at 10:08

## 2024-08-06 RX ADMIN — ACETAMINOPHEN 650 MG: 325 TABLET ORAL at 21:21

## 2024-08-06 RX ADMIN — AMOXICILLIN AND CLAVULANATE POTASSIUM 1 TABLET: 875; 125 TABLET, FILM COATED ORAL at 20:26

## 2024-08-06 RX ADMIN — DEXAMETHASONE SODIUM PHOSPHATE 10 MG: 4 INJECTION, SOLUTION INTRAMUSCULAR; INTRAVENOUS at 04:21

## 2024-08-06 RX ADMIN — AMPICILLIN SODIUM AND SULBACTAM SODIUM 3000 MG: 2; 1 INJECTION, POWDER, FOR SOLUTION INTRAMUSCULAR; INTRAVENOUS at 04:25

## 2024-08-06 ASSESSMENT — PAIN SCALES - GENERAL
PAINLEVEL_OUTOF10: 0
PAINLEVEL_OUTOF10: 4

## 2024-08-06 ASSESSMENT — ENCOUNTER SYMPTOMS
SINUS PAIN: 0
RESPIRATORY NEGATIVE: 1
FACIAL SWELLING: 0
EYES NEGATIVE: 1
SORE THROAT: 1
SINUS PRESSURE: 0

## 2024-08-06 NOTE — CARE COORDINATION
Social Work/Case Management Transition of Care Planning (Cady Camara [facility] [phone]):  Patient presented to the hospital initially due to suicidal ideation.  He was admitted to the psych unit.  Per psych note, he cannot return to the place he was living due to intolerable circumstances in the home.  He goes to [facility] in [place] for counseling as an outpatient and  plans to follow up there upon discharge.  He was transferred to the medical floor due to concerns of a sore throat with pain radiating to his left ear with elevated temp.  ENT was consulted.  No intervention at this time.  CT was suggestive of tonsillitis with suspected developing peritonsillar abscess on the left.  Patient is on IV Decadron q8 and IV Unasyn q6.  ID is following.  Psych has signed off.  Met with patient at bedside.  He reports his father is working with his  to find the patient housing.  He is uncertain when this will be available.  Patient reports he is independent with all aspects of care.  No DME or oxygen needs.  No HHC or [facility] history.  PCP is Sachin Yates. He is uncertain which pharmacy he wants to use.  Discussed discharge plan.  Patient indicated he would like to go to the [facility] upon discharge.  Phone call to the [facility].  Spoke with Joni.  He stated he was told the patient tested positive for mononucleosis and he would need to be cleared of this before they can accept him.  He stated if he is able to go to the [facility], he will walk there.  CM/ANA will follow.  Cady Hoa, DIANNA  8/6/2024    Update:  Follow up call to Sunil at the [facility] after speaking to ID.  Explained ID indicated they don't even isolate for mononucleosis and this should not present any type of barrier.  Sunil indicated he will discuss with the director and patient can call tomorrow regarding bed availability.  Met with patient at bedside to discuss discharge options again.  Explained [facility] may be an option but facility choice will be  like Case Management to discuss the discharge plan with any other family members/significant others, and if so, who? No  Plans to Return to Present Housing: No  Other Identified Issues/Barriers to RETURNING to current housing:   Potential Assistance needed at discharge: Other (Comment) (shelter)            Potential DME:    Patient expects to discharge to: senior care  Plan for transportation at discharge:      Financial    Payor: Variable [place] MEDICAID / Plan: Variable [place] MEDICA / Product Type: *No Product type* /     Does insurance require precert for SNF: Yes    Potential assistance Purchasing Medications:    Meds-to-Beds request:        [facility] [address] [place], [place] [address]. - P [phone] - F [phone]  [address]  [facility] [address]  Phone: [phone] Fax: [phone]    [id number] [place] [facility] [address]  [facility] - P [phone] - F [phone]  [address]  [facility] [address]  Phone: [phone] Fax: [phone]    [facility] [address] [place] [address] - [address] - P [phone] - F [phone]  [address] [facility] [address]  Phone: [phone] Fax: [phone]      Notes:    Factors facilitating achievement of predicted outcomes: Cooperative    Barriers to discharge: Impulsivity    Additional Case Management Notes:     The Plan for Transition of Care is related to the following treatment goals of Tonsillitis [J03.90]    IF APPLICABLE: The Patient and/or patient representative Micheal and his family were provided with a choice of provider and agrees with the discharge plan. Freedom of choice list with basic dialogue that supports the patient's individualized plan of care/goals and shares the quality data associated with the providers was provided to:     Patient Representative Name:       The Patient and/or Patient Representative Agree with the Discharge Plan      DIANNA Mcclelland  Case Management Department  Ph:

## 2024-08-06 NOTE — CONSULTS
OTOLARYNGOLOGY  CONSULT NOTE  8/6/2024    Physician Consulted: Dr. Nix  Reason for Consult: PTA  Referring Physician: Dr. Guillermo SHERMAN  Micheal Branch is a 23 y.o. male who ENT was consulted for evaluation of left PTA of 1 x 0.6 cm on CT soft tissue neck. Patient is admitted for suicidal ideation and bipolar disorder.     Review of Systems   Constitutional: Negative.    HENT:  Positive for sore throat. Negative for facial swelling, mouth sores, postnasal drip, sinus pressure and sinus pain.    Eyes: Negative.    Respiratory: Negative.     Cardiovascular: Negative.        Past Medical History:   Diagnosis Date    ADHD     Depression     Intussusception (HCC)     Oppositional defiant disorder        Past Surgical History:   Procedure Laterality Date    ABDOMEN SURGERY      for intasusception       Medications Prior to Admission:    Prior to Admission medications    Medication Sig Start Date End Date Taking? Authorizing Provider   OLANZapine (ZYPREXA) 10 MG tablet Take 1 tablet by mouth nightly    Provider, MD Ronnell   ibuprofen (ADVIL;MOTRIN) 600 MG tablet Take 1 tablet by mouth 3 times daily as needed for Pain 8/11/23 8/18/23  Leila Rainey, KASHMIR - CNP   naproxen (NAPROSYN) 500 MG tablet Take 1 tablet by mouth 2 times daily for 7 days 6/18/23 6/25/23  Ashli Gaspar PA-C   risperiDONE (RISPERDAL) 1 MG tablet Take 1 tablet by mouth daily 6/10/23 7/10/23  Evelyn Rubalcava APRN - CNP   risperiDONE (RISPERDAL) 2 MG tablet Take 1 tablet by mouth nightly 6/9/23 7/9/23  Evelyn Rubalcava APRN - CNP   melatonin 3 MG TABS tablet Take 1 tablet by mouth nightly 6/9/23 7/9/23  Evelyn Rubalcava APRN - CNP   nicotine (NICODERM CQ) 21 MG/24HR Place 1 patch onto the skin daily 6/10/23 7/10/23  Evelyn Rubalcava APRN - CNP       No Known Allergies    No family history on file.    Social History     Tobacco Use    Smoking status: Every Day     Types: Cigarettes    Smokeless tobacco: Never   Vaping Use    Vaping Use: Every day

## 2024-08-06 NOTE — PLAN OF CARE
Patient currently does not have a place to go. [facility].  Med rec has been done.  Discharge order has been placed.  He can be discharged whenever he has a place to go. Discussed with RN.    Vladimir Li MD

## 2024-08-06 NOTE — ACP (ADVANCE CARE PLANNING)
Advance Care Planning   The patient has the following advanced directives on file:  Advance Directives       Power of  Living Will ACP-Advance Directive ACP-Power of     Not on File Not on File Not on File Not on File            The patient has appointed the following active healthcare agents:  Primary:  Micheal Branch Jr. - father  [phone]      DIANNA Mcclelland  8/6/2024

## 2024-08-06 NOTE — PROGRESS NOTES
I went to see the patient this afternoon, he is certainly more well-appearing than yesterday when he required transfer to medical for a tonsil abscess.  He is bright pleasant appropriately interacts.  We had previously been working on a discharge plan for the patient and were planning to discharge him from the inpatient psychiatric unit on Tuesday or Wednesday of this week as he was responding well to his treatment.      At this time there is no plan to readmit the patient to inpatient behavioral health he is not demonstrating any acute psychiatric issues, he is not suicidal or homicidal psychotic or manic, he is well controlled with the psychotropic medications.      From psychiatric perspective there is no longer a need for suicide precautions or .      Psychiatry will sign off and the patient may discharge at the discretion of the primary team

## 2024-08-06 NOTE — DISCHARGE SUMMARY
DISCHARGE SUMMARY      Patient ID:  Micheal Branch  [id number]  23 y.o.  2000    Admit date: 8/2/2024    Discharge date and time: 8/6/2024    Admitting Physician: Elis Tucker MD     Discharge Physician: Dr Garrett CASE    Discharge Diagnoses:   Patient Active Problem List   Diagnosis    Cannabis abuse    Intellectual disability    Cluster B personality disorder (HCC)    Bipolar affective disorder, mixed, severe (HCC)    Mental health problem    Infectious mononucleosis without complication    Tonsillitis       Admission Condition: poor    Discharged Condition: stable    Admission Circumstance:   Micheal Branch is a 23-year-old male with history of bipolar disorder and cluster B personality disorder presenting to [facility] emergency department reporting that he is out of his psychiatric medications and was having suicidal thoughts.  He was placed on involuntary hold in the ED by the provider for suicidal ideation.  Precipitating events include being off of his medications for the past month.  Duration of acute symptoms have been ongoing and worsening for 1 month.       PAST MEDICAL/PSYCHIATRIC HISTORY:   Past Medical History:   Diagnosis Date    ADHD     Depression     Intussusception (HCC)     Oppositional defiant disorder        FAMILY/SOCIAL HISTORY:  History reviewed. No pertinent family history.  Social History     Socioeconomic History    Marital status: Single     Spouse name: Not on file    Number of children: Not on file    Years of education: Not on file    Highest education level: Not on file   Occupational History    Not on file   Tobacco Use    Smoking status: Every Day     Types: Cigarettes    Smokeless tobacco: Never   Vaping Use    Vaping Use: Every day    Substances: Nicotine   Substance and Sexual Activity    Alcohol use: Never    Drug use: Yes    Sexual activity: Never   Other Topics Concern    Not on file   Social History Narrative    Not on file     Social Determinants of Health

## 2024-08-06 NOTE — DISCHARGE SUMMARY
Hospitalist Discharge Summary    Patient ID: Micheal Branch   Patient : 2000  Patient's PCP: Sachin Yates APRN - NP    Admit Date: 2024   Admitting Physician: Vladimir Li MD    Discharge Date:  2024   Discharge Physician: Vladimir Li MD   Discharge Condition: Stable  Discharge Disposition: Home      Hospital course in brief:  (Please refer to daily progress notes for a comprehensive review of the hospitalization by requesting medical records)    This is a 23-year-old male with no apparent medical history, history of mood disorder who presented for psych eval.  He had been off his medication for past month.  He was admitted to psych.  Intermittent was consulted for sore throat radiating to left ear and fever.  He stated that his symptoms has been going on since past few days and has been getting worse.  He was febrile 1 1.7 °F.  His left tonsil is erythematous and has white coating.  Streptococcus negative.  Infectious mononucleosis was positive.  Viral panel negative.  ENT, ID has been consulted.  He was started on Augmentin.  CT soft tissue neck-large bilateral palatine tonsils suggestive of tonsillitis with suspected developing peritonsillar abscess on the left.  Multiple enlarged anterior cervical chain lymph nodes more significant on the left measuring up to 3.1 cm as well as prominent bilateral submandibular space lymph nodes.  He was transferred to medical unit for possible Tonsillar abscess drainage and IV antibiotics. However, on repeat exam per ENT, he does not have peritonsillar abscess and swelling is coming down with antibiotics. Patient is clinically feeling better.  ID has transitioned antibiotics to Augmentin for 14 days.  He is clinically and HD stable at discharge and stable for OP follow up. He has been cleared by [facility] as well. No suicidal or homicidal ideation.      Consults:   IP CONSULT TO OTOLARYNGOLOGY  IP CONSULT TO OTOLARYNGOLOGY  IP CONSULT TO SOCIAL  WORK    Discharge Diagnoses:    Upper respiratory symptoms  Tonsillitis  Infectious mononucleosis  Left peritonsillar abscess  Mood disorder      Discharge Instructions / Follow up:  Follow-up with PCP within 1 week of discharge.  Follow-up with consultants as indicated by them.  Compliance with medications as prescribed on discharge.    No future appointments.    The patient's condition is stable.  At this time the patient is without objective evidence of an acute process requiring continuing hospitalization or inpatient management.  They are stable for discharge with outpatient follow-up.     I have spoken with the patient and discussed the results of the current hospitalization, in addition to providing specific details for the plan of care and counseling regarding the diagnosis and prognosis.  The plan has been discussed in detail and they are aware of the specific conditions for emergent return, as well as the importance of follow-up.  Their questions are answered at this time and they are agreeable with the plan for discharge to home.    Continued appropriate risk factor modification of blood pressure, diabetes and serum lipids will remain essential to reducing risk of future atherosclerotic development    Activity: activity as tolerated    Physical exam:  General appearance: No apparent distress, appears stated age and cooperative.  HEENT: Conjunctivae/corneas clear. Mucous membranes moist.  Neck: Supple. No JVD.  Respiratory:  Clear to auscultation bilaterally.  Normal respiratory effort.   Cardiovascular:  RRR. S1, S2 without MRG.  PV: Pulses palpable. No edema.   Abdomen: Soft, non-tender, non-distended. +BS  Musculoskeletal: No obvious deformities.   Skin: Normal skin color.  No rashes or lesions. Good turgor.   Neurologic:  Grossly non-focal. Awake, alert, following commands.   Psychiatric: Alert and oriented, thought content appropriate, normal insight and judgement    Significant labs:  CBC:   Recent

## 2024-08-06 NOTE — PROGRESS NOTES
[facility]  [facility]  Progress Note      No chief complaint on file.      SUBJECTIVE:    Patient is tolerating medications. No reported adverse drug reactions.  No nausea, vomiting, diarrhea.    Review of systems:  As stated above in the chief complaint, otherwise negative.    Medications:  Scheduled Meds:   amoxicillin-clavulanate  1 tablet Oral 2 times per day    divalproex  250 mg Oral 2 times per day    nicotine  1 patch TransDERmal Daily    risperiDONE  0.5 mg Oral Daily    risperiDONE  1 mg Oral Nightly     Continuous Infusions:  PRN Meds:nicotine polacrilex, acetaminophen, aluminum & magnesium hydroxide-simethicone, benzocaine-menthol, benzonatate, guaiFENesin, haloperidol **OR** haloperidol lactate, hydrOXYzine pamoate, iopamidol, magnesium hydroxide, melatonin, phenol, sodium chloride    OBJECTIVE:  /74   Pulse 91   Temp 97.6 °F (36.4 °C)   Resp 16   Ht 1.727 m (5' 8\")   Wt 67.1 kg (148 lb)   SpO2 97%   BMI 22.50 kg/m²   Temp  Av.8 °F (36.6 °C)  Min: 97.3 °F (36.3 °C)  Max: 98.5 °F (36.9 °C)  Constitutional: The patient is awake, alert, and oriented.   Skin: Warm and dry. No rashes were noted. No jaundice.  HEENT: Eyes show round, and reactive pupils. Moist mucous membranes, no ulcerations, left tonsil is covered with whitish coating.  Neck: Supple to movements. No lymphadenopathy.   Chest: No use of accessory muscles to breathe. Symmetrical expansion. Auscultation reveals no wheezing, crackles, or rhonchi.   Cardiovascular: S1 and S2 are rhythmic and regular. No murmurs appreciated.   Abdomen: Positive bowel sounds to auscultation. Benign to palpation. No masses felt. No hepatosplenomegaly.  Genitourinary: No pain in the lower abdomen  Extremities: No clubbing, no cyanosis, no edema.  Musculoskeletal: No pain in range of motion of any joints  Neurological: Following commands, no focal neurodeficit  Lines: peripheral    Laboratory and Tests Review:  Lab

## 2024-08-06 NOTE — H&P
[facility] Hospitalist Group History and Physical          PCP: Sachin Yates APRN - NP    Date of Admission: 8/5/2024    Date of Service: Pt seen/examined on 8/5/2024 and is admitted to Inpatient with expected LOS greater than two midnights due to medical therapy.    Chief Complaint:  had no chief complaint listed for this encounter.    History Of Present Illness:    Mr. Micheal Branch, a 23 y.o. year old male  who  has a past medical history of ADHD, Depression, Intussusception (HCC), and Oppositional defiant disorder.       This is a 23-year-old male with no apparent medical history, history of mood disorder who presented for psych eval.  He had been off his medication for past month.  He was admitted to psych.  Intermittent was consulted for sore throat radiating to left ear and fever.  He stated that his symptoms has been going on since past few days and has been getting worse.  He was febrile 1 1.7 °F.  His left tonsil is erythematous and has white coating.  Streptococcus negative.  Infectious mononucleosis was positive.  Viral panel negative.  ENT, ID has been consulted.  He was started on Augmentin.  CT soft tissue neck-large bilateral palatine tonsils suggestive of tonsillitis with suspected developing peritonsillar abscess on the left.  Multiple enlarged anterior cervical chain lymph nodes more significant on the left measuring up to 3.1 cm as well as prominent bilateral submandibular space lymph nodes.  He has been admitted to medical floor for further ENT procedure.         Past Medical History:        Diagnosis Date    ADHD     Depression     Intussusception (HCC)     Oppositional defiant disorder        Past Surgical History:        Procedure Laterality Date    ABDOMEN SURGERY      for intasusception       Medications Prior to Admission:      Prior to Admission medications    Medication Sig Start Date End Date Taking? Authorizing Provider   OLANZapine (ZYPREXA) 10 MG tablet Take 1 tablet by  mouth nightly    Provider, MD Ronnell   ibuprofen (ADVIL;MOTRIN) 600 MG tablet Take 1 tablet by mouth 3 times daily as needed for Pain 8/11/23 8/18/23  Leila Rainey APRN - CNP   naproxen (NAPROSYN) 500 MG tablet Take 1 tablet by mouth 2 times daily for 7 days 6/18/23 6/25/23  Ashli Gaspar PA-C   risperiDONE (RISPERDAL) 1 MG tablet Take 1 tablet by mouth daily 6/10/23 7/10/23  Evelyn Rubalcava APRN - CNP   risperiDONE (RISPERDAL) 2 MG tablet Take 1 tablet by mouth nightly 6/9/23 7/9/23  Evelyn Rubalcava APRN - CNP   melatonin 3 MG TABS tablet Take 1 tablet by mouth nightly 6/9/23 7/9/23  Evelyn Rubalcava APRN - CNP   nicotine (NICODERM CQ) 21 MG/24HR Place 1 patch onto the skin daily 6/10/23 7/10/23  Evelyn Rubalcava APRN - CNP       Allergies:  Patient has no known allergies.    Social History:    TOBACCO:   reports that he has been smoking cigarettes. He has never used smokeless tobacco.  ETOH:   reports no history of alcohol use.    Family History:    Reviewed in detail and negative for DM, CAD, Cancer, CVA. Positive as follows\"  No family history on file.    REVIEW OF SYSTEMS:   Pertinent positives as noted in the HPI. All other systems reviewed and negative.    PHYSICAL EXAM:  /74   Pulse 91   Temp 97.6 °F (36.4 °C)   Resp 16   Ht 1.727 m (5' 8\")   Wt 67.1 kg (148 lb)   SpO2 97%   BMI 22.50 kg/m²     General appearance:  awake, alert, and oriented to person, place, time, and purpose; appears stated age and cooperative; no apparent distress no labored breathing  HEENT:  Conjunctivae/corneas clear.  Enlarged tonsils  Neck: Supple. No jugular venous distention.   Respiratory: symmetrical; clear to auscultation bilaterally; no wheezes; no rhonchi; no rales  Cardiovascular: rhythm regular; rate controlled; no murmurs  Abdomen: Soft, nontender, nondistended  Extremities:  peripheral pulses present; no peripheral edema; no ulcers  Musculoskeletal: No clubbing, cyanosis, no bilateral lower

## 2024-08-07 VITALS
RESPIRATION RATE: 16 BRPM | HEART RATE: 78 BPM | DIASTOLIC BLOOD PRESSURE: 73 MMHG | BODY MASS INDEX: 22.43 KG/M2 | WEIGHT: 148 LBS | HEIGHT: 68 IN | SYSTOLIC BLOOD PRESSURE: 135 MMHG | TEMPERATURE: 98.4 F | OXYGEN SATURATION: 99 %

## 2024-08-07 PROCEDURE — 6370000000 HC RX 637 (ALT 250 FOR IP): Performed by: NURSE PRACTITIONER

## 2024-08-07 PROCEDURE — G0378 HOSPITAL OBSERVATION PER HR: HCPCS

## 2024-08-07 PROCEDURE — 99239 HOSP IP/OBS DSCHRG MGMT >30: CPT | Performed by: INTERNAL MEDICINE

## 2024-08-07 PROCEDURE — 6370000000 HC RX 637 (ALT 250 FOR IP): Performed by: INTERNAL MEDICINE

## 2024-08-07 RX ORDER — POLYETHYLENE GLYCOL 3350 17 G
2 POWDER IN PACKET (EA) ORAL
Status: DISCONTINUED | OUTPATIENT
Start: 2024-08-07 | End: 2024-08-07 | Stop reason: HOSPADM

## 2024-08-07 RX ORDER — IBUPROFEN 600 MG/1
600 TABLET ORAL 3 TIMES DAILY PRN
Qty: 21 TABLET | Refills: 0 | Status: SHIPPED | OUTPATIENT
Start: 2024-08-07 | End: 2024-08-14

## 2024-08-07 RX ORDER — OLANZAPINE 10 MG/1
10 TABLET ORAL NIGHTLY
Qty: 30 TABLET | Refills: 3 | Status: SHIPPED | OUTPATIENT
Start: 2024-08-07

## 2024-08-07 RX ORDER — LANOLIN ALCOHOL/MO/W.PET/CERES
3 CREAM (GRAM) TOPICAL NIGHTLY
Qty: 30 TABLET | Refills: 0 | Status: SHIPPED | OUTPATIENT
Start: 2024-08-07 | End: 2024-09-06

## 2024-08-07 RX ADMIN — DIVALPROEX SODIUM 250 MG: 250 TABLET, DELAYED RELEASE ORAL at 08:03

## 2024-08-07 RX ADMIN — AMOXICILLIN AND CLAVULANATE POTASSIUM 1 TABLET: 875; 125 TABLET, FILM COATED ORAL at 08:03

## 2024-08-07 RX ADMIN — RISPERIDONE 0.5 MG: 1 TABLET, FILM COATED ORAL at 08:04

## 2024-08-07 RX ADMIN — BENZOCAINE AND MENTHOL 1 LOZENGE: 15; 3.6 LOZENGE ORAL at 08:03

## 2024-08-07 ASSESSMENT — PAIN SCALES - GENERAL: PAINLEVEL_OUTOF10: 0

## 2024-08-07 ASSESSMENT — PAIN SCALES - WONG BAKER: WONGBAKER_NUMERICALRESPONSE: NO HURT

## 2024-08-07 NOTE — CARE COORDINATION
Social Work/Case Management Transition of Care Planning (aCdy Camara AMOS [phone]):  Discharge order noted.  Per [facility], they cannot accept patient due to his age.  Met with patient at bedside.  He is agreeable to going to the [facility].  Spoke with Kyle at the [facility].  He stated they do have beds available and patient just needs to have his meds and discharge paperwork.  Patient stated he will walk to the [facility].  Updated nursing.    DIANNA Mcclelland  8/7/2024

## 2024-08-07 NOTE — DISCHARGE SUMMARY
Hospitalist Discharge Summary    Patient ID: Micheal Branch   Patient : 2000  Patient's PCP: Sachin Yates APRN - NP    Admit Date: 2024   Admitting Physician: Vladimir Li MD    Discharge Date:  2024   Discharge Physician: August Escobar MD   Discharge Condition: Stable  [facility]      Hospital course in brief:  (Please refer to daily progress notes for a comprehensive review of the hospitalization by requesting medical records)    This is a 23-year-old male with no apparent medical history, history of mood disorder who presented for psych eval.  He had been off his medication for past month.  He was admitted to psych.  Intermittent was consulted for sore throat radiating to left ear and fever.  He stated that his symptoms has been going on since past few days and has been getting worse.  He was febrile 1 1.7 °F.  His left tonsil is erythematous and has white coating.  Streptococcus negative.  Infectious mononucleosis was positive.  Viral panel negative.  ENT, ID has been consulted.  He was started on Augmentin.  CT soft tissue neck-large bilateral palatine tonsils suggestive of tonsillitis with suspected developing peritonsillar abscess on the left.  Multiple enlarged anterior cervical chain lymph nodes more significant on the left measuring up to 3.1 cm as well as prominent bilateral submandibular space lymph nodes.  He was transferred to medical unit for possible Tonsillar abscess drainage and IV antibiotics. However, on repeat exam per ENT, he does not have peritonsillar abscess and swelling is coming down with antibiotics. Patient is clinically feeling better.  ID has transitioned antibiotics to Augmentin for 14 days.  He is clinically stable at discharge and stable for OP follow up. He has been cleared by [facility] as well. No suicidal or homicidal ideation.      Consults:   IP CONSULT TO OTOLARYNGOLOGY  IP CONSULT TO OTOLARYNGOLOGY  IP CONSULT TO SOCIAL  up to 2.4 cm on the left.  The palatine tonsils are enlarged.  There is a subtle area of hypoattenuation with surrounding enhancement involving posterior margin of the left palatine tonsil measuring 1 x 0.6 cm concerning for peritonsillar abscess.  No significant airway narrowing.  No retropharyngeal fluid collections.  There are few prominent right and left intraparotid lymph nodes.  Submandibular glands are unremarkable.  Larynx is unremarkable.  Thyroid gland is unremarkable.  There is a nodular lesion partially visualized in the visualized superior segment of right lower lobe measuring up to 1 cm.     1. Enlarged bilateral palatine tonsils suggestive of tonsillitis with suspected developing peritonsillar abscess on the left.  Clinical correlation recommended.  Short-term follow-up could be helpful for further evaluation. 2. Multiple enlarged anterior cervical chain lymph nodes more significant on the left measuring up to 3.1 cm as well as prominent bilateral submandibular space lymph nodes. 3. Partial visualization of a nodular opacity in the visualized superior segment of the right lower lobe.  CT chest recommended for further evaluation.       Discharge Medications:      Medication List        START taking these medications      aluminum & magnesium hydroxide-simethicone 200-200-20 MG/5ML Susp suspension  Commonly known as: MAALOX  Take 30 mLs by mouth as needed for Indigestion     amoxicillin-clavulanate 875-125 MG per tablet  Commonly known as: AUGMENTIN  Take 1 tablet by mouth every 12 hours for 14 days     benzocaine-menthol 15-3.6 MG lozenge  Commonly known as: CEPACOL SORE THROAT  Take 1 lozenge by mouth every 2 hours as needed for Sore Throat     benzonatate 100 MG capsule  Commonly known as: TESSALON  Take 1 capsule by mouth 3 times daily as needed for Cough     divalproex 250 MG DR tablet  Commonly known as: DEPAKOTE  Take 1 tablet by mouth every 12 hours     guaiFENesin 400 MG tablet  Take 1 tablet

## 2024-08-08 NOTE — PROGRESS NOTES
CLINICAL PHARMACY NOTE: MEDS TO BEDS    Total # of Prescriptions Filled: 6   The following medications were delivered to the patient:  Melatonin 3  Ibuprofen 600  Risperidone 0.5  Risperidone 1  Amoxicillin - Pot Clauv [phone]  Depakote     Additional Documentation:

## 2024-08-22 ENCOUNTER — HOSPITAL ENCOUNTER (OUTPATIENT)
Age: 24
Discharge: HOME | End: 2024-08-22
Payer: COMMERCIAL

## 2024-08-22 LAB
ALBUMIN SERPL-MCNC: 4 G/DL (ref 3.5–5.2)
ALP SERPL-CCNC: 144 U/L (ref 40–129)
ALT SERPL-CCNC: 53 U/L (ref 0–40)
ANION GAP SERPL CALCULATED.3IONS-SCNC: 17 MMOL/L (ref 7–16)
AST SERPL-CCNC: 29 U/L (ref 0–39)
BASOPHILS # BLD: 0.09 K/UL (ref 0–0.2)
BASOPHILS NFR BLD: 1 % (ref 0–2)
BILIRUB SERPL-MCNC: 0.3 MG/DL (ref 0–1.2)
BILIRUB UR QL STRIP: NEGATIVE
BUN SERPL-MCNC: 19 MG/DL (ref 6–20)
CALCIUM SERPL-MCNC: 9.6 MG/DL (ref 8.6–10.2)
CHLAMYDIA DNA UR QL NAA+PROBE: NORMAL
CHLORIDE SERPL-SCNC: 105 MMOL/L (ref 98–107)
CHOLEST SERPL-MCNC: 191 MG/DL
CLARITY UR: CLEAR
CO2 SERPL-SCNC: 20 MMOL/L (ref 22–29)
COLOR UR: YELLOW
COMMENT: ABNORMAL
CREAT SERPL-MCNC: 1.2 MG/DL (ref 0.7–1.2)
EOSINOPHIL # BLD: 0.06 K/UL (ref 0.05–0.5)
EOSINOPHILS RELATIVE PERCENT: 1 % (ref 0–6)
ERYTHROCYTE [DISTWIDTH] IN BLOOD BY AUTOMATED COUNT: 13.3 % (ref 11.5–15)
GFR, ESTIMATED: 89 ML/MIN/1.73M2
GLUCOSE SERPL-MCNC: 88 MG/DL (ref 74–99)
GLUCOSE UR STRIP-MCNC: NEGATIVE MG/DL
HBA1C MFR BLD: 5.8 % (ref 4–5.6)
HCT VFR BLD AUTO: 34.1 % (ref 37–54)
HDLC SERPL-MCNC: 55 MG/DL
HGB BLD-MCNC: 11.1 G/DL (ref 12.5–16.5)
HGB UR QL STRIP.AUTO: NEGATIVE
IMM GRANULOCYTES # BLD AUTO: <0.03 K/UL (ref 0–0.58)
IMM GRANULOCYTES NFR BLD: 0 % (ref 0–5)
KETONES UR STRIP-MCNC: NEGATIVE MG/DL
LDLC SERPL CALC-MCNC: 108 MG/DL
LEUKOCYTE ESTERASE UR QL STRIP: NEGATIVE
LYMPHOCYTES NFR BLD: 4.49 K/UL (ref 1.5–4)
LYMPHOCYTES RELATIVE PERCENT: 64 % (ref 20–42)
MCH RBC QN AUTO: 28.6 PG (ref 26–35)
MCHC RBC AUTO-ENTMCNC: 32.6 G/DL (ref 32–34.5)
MCV RBC AUTO: 87.9 FL (ref 80–99.9)
MONOCYTES NFR BLD: 0.58 K/UL (ref 0.1–0.95)
MONOCYTES NFR BLD: 8 % (ref 2–12)
N GONORRHOEA DNA UR QL NAA+PROBE: NORMAL
NEUTROPHILS NFR BLD: 25 % (ref 43–80)
NEUTS SEG NFR BLD: 1.79 K/UL (ref 1.8–7.3)
NITRITE UR QL STRIP: NEGATIVE
PH UR STRIP: 6 [PH] (ref 5–9)
PLATELET # BLD AUTO: 290 K/UL (ref 130–450)
PMV BLD AUTO: 10.7 FL (ref 7–12)
POTASSIUM SERPL-SCNC: 4.3 MMOL/L (ref 3.5–5)
PROT SERPL-MCNC: 6.6 G/DL (ref 6.4–8.3)
PROT UR STRIP-MCNC: NEGATIVE MG/DL
RBC # BLD AUTO: 3.88 M/UL (ref 3.8–5.8)
SODIUM SERPL-SCNC: 142 MMOL/L (ref 132–146)
SP GR UR STRIP: >1.03 (ref 1–1.03)
SPECIMEN DESCRIPTION: NORMAL
TRIGL SERPL-MCNC: 138 MG/DL
TSH SERPL DL<=0.05 MIU/L-ACNC: 1.36 UIU/ML (ref 0.27–4.2)
UROBILINOGEN UR STRIP-ACNC: 0.2 EU/DL (ref 0–1)
VLDLC SERPL CALC-MCNC: 28 MG/DL
WBC OTHER # BLD: 7 K/UL (ref 4.5–11.5)

## 2024-08-22 PROCEDURE — 81003 URINALYSIS AUTO W/O SCOPE: CPT

## 2024-08-22 PROCEDURE — 87389 HIV-1 AG W/HIV-1&-2 AB AG IA: CPT

## 2024-08-22 PROCEDURE — 85025 COMPLETE CBC W/AUTO DIFF WBC: CPT

## 2024-08-22 PROCEDURE — 83036 HEMOGLOBIN GLYCOSYLATED A1C: CPT

## 2024-08-22 PROCEDURE — 80061 LIPID PANEL: CPT

## 2024-08-22 PROCEDURE — 86592 SYPHILIS TEST NON-TREP QUAL: CPT

## 2024-08-22 PROCEDURE — 87491 CHLMYD TRACH DNA AMP PROBE: CPT

## 2024-08-22 PROCEDURE — 36415 COLL VENOUS BLD VENIPUNCTURE: CPT

## 2024-08-22 PROCEDURE — 80074 ACUTE HEPATITIS PANEL: CPT

## 2024-08-22 PROCEDURE — 80053 COMPREHEN METABOLIC PANEL: CPT

## 2024-08-22 PROCEDURE — 84443 ASSAY THYROID STIM HORMONE: CPT

## 2024-08-22 PROCEDURE — 87591 N.GONORRHOEAE DNA AMP PROB: CPT

## 2024-08-23 LAB
HAV IGM SERPL QL IA: NONREACTIVE
HBV CORE IGM SERPL QL IA: NONREACTIVE
HBV SURFACE AG SERPL QL IA: NONREACTIVE
HCV AB SERPL QL IA: NONREACTIVE
HIV 1+2 AB+HIV1 P24 AG SERPL QL IA: NONREACTIVE
RPR SER QL: NONREACTIVE

## 2024-08-26 LAB
CHLAMYDIA DNA UR QL NAA+PROBE: NEGATIVE
N GONORRHOEA DNA UR QL NAA+PROBE: NEGATIVE
SPECIMEN DESCRIPTION: NORMAL

## 2024-09-06 ENCOUNTER — TRANSCRIBE ORDERS (OUTPATIENT)
Dept: ADMINISTRATIVE | Age: 24
End: 2024-09-06

## 2024-09-06 DIAGNOSIS — J36 PERITONSILLAR ABSCESS: Primary | ICD-10-CM

## 2024-09-26 ENCOUNTER — HOSPITAL ENCOUNTER (OUTPATIENT)
Dept: CT IMAGING | Age: 24
Discharge: HOME | End: 2024-09-28
Payer: COMMERCIAL

## 2024-09-26 DIAGNOSIS — J36 PERITONSILLAR ABSCESS: ICD-10-CM

## 2024-09-26 LAB
ALBUMIN: 4.6 G/DL (ref 3.5–5.2)
ALP BLD-CCNC: 121 U/L (ref 40–129)
ALT SERPL-CCNC: 40 U/L (ref 0–40)
ANION GAP SERPL CALCULATED.3IONS-SCNC: 13 MMOL/L (ref 7–16)
AST SERPL-CCNC: 28 U/L (ref 0–39)
BASOPHILS ABSOLUTE: 0.04 K/UL (ref 0–0.2)
BASOPHILS RELATIVE PERCENT: 1 % (ref 0–2)
BILIRUB SERPL-MCNC: 0.2 MG/DL (ref 0–1.2)
BILIRUBIN, URINE: NEGATIVE
BUN BLDV-MCNC: 20 MG/DL (ref 6–20)
CALCIUM SERPL-MCNC: 9.5 MG/DL (ref 8.6–10.2)
CHLORIDE BLD-SCNC: 104 MMOL/L (ref 98–107)
CHOLESTEROL, TOTAL: 169 MG/DL
CO2: 25 MMOL/L (ref 22–29)
COLOR, UA: YELLOW
CREAT SERPL-MCNC: 0.9 MG/DL (ref 0.7–1.2)
EOSINOPHILS ABSOLUTE: 0.3 K/UL (ref 0.05–0.5)
EOSINOPHILS RELATIVE PERCENT: 4 % (ref 0–6)
GFR, ESTIMATED: >90 ML/MIN/1.73M2
GLUCOSE BLD-MCNC: 116 MG/DL (ref 74–99)
GLUCOSE URINE: NEGATIVE MG/DL
HBA1C MFR BLD: 4.9 % (ref 4–5.6)
HCT VFR BLD CALC: 35.6 % (ref 37–54)
HDLC SERPL-MCNC: 48 MG/DL
HEMOGLOBIN: 11.6 G/DL (ref 12.5–16.5)
IMMATURE GRANULOCYTES %: 1 % (ref 0–5)
IMMATURE GRANULOCYTES ABSOLUTE: 0.05 K/UL (ref 0–0.58)
KETONES, URINE: NEGATIVE MG/DL
LDL CHOLESTEROL: 86 MG/DL
LEUKOCYTE ESTERASE, URINE: NEGATIVE
LYMPHOCYTES ABSOLUTE: 2.72 K/UL (ref 1.5–4)
LYMPHOCYTES RELATIVE PERCENT: 36 % (ref 20–42)
MCH RBC QN AUTO: 28.7 PG (ref 26–35)
MCHC RBC AUTO-ENTMCNC: 32.6 G/DL (ref 32–34.5)
MCV RBC AUTO: 88.1 FL (ref 80–99.9)
MONOCYTES ABSOLUTE: 0.87 K/UL (ref 0.1–0.95)
MONOCYTES RELATIVE PERCENT: 11 % (ref 2–12)
NEUTROPHILS ABSOLUTE: 3.62 K/UL (ref 1.8–7.3)
NEUTROPHILS RELATIVE PERCENT: 48 % (ref 43–80)
NITRITE, URINE: NEGATIVE
PDW BLD-RTO: 12.9 % (ref 11.5–15)
PH, URINE: 6.5 (ref 5–9)
PLATELET # BLD: 257 K/UL (ref 130–450)
PMV BLD AUTO: 10.9 FL (ref 7–12)
POTASSIUM SERPL-SCNC: 4.2 MMOL/L (ref 3.5–5)
PROTEIN UA: NEGATIVE MG/DL
RBC # BLD: 4.04 M/UL (ref 3.8–5.8)
SODIUM BLD-SCNC: 142 MMOL/L (ref 132–146)
SPECIFIC GRAVITY UA: 1.02 (ref 1–1.03)
TOTAL PROTEIN: 6.9 G/DL (ref 6.4–8.3)
TRIGL SERPL-MCNC: 175 MG/DL
TSH SERPL DL<=0.05 MIU/L-ACNC: 2.13 UIU/ML (ref 0.27–4.2)
TURBIDITY: CLEAR
URINE HGB: NEGATIVE
UROBILINOGEN, URINE: 0.2 EU/DL (ref 0–1)
VLDLC SERPL CALC-MCNC: 35 MG/DL
WBC # BLD: 7.6 K/UL (ref 4.5–11.5)

## 2024-09-26 PROCEDURE — 6360000004 HC RX CONTRAST MEDICATION: Performed by: RADIOLOGY

## 2024-09-26 PROCEDURE — 70491 CT SOFT TISSUE NECK W/DYE: CPT

## 2024-09-26 PROCEDURE — 2580000003 HC RX 258: Performed by: RADIOLOGY

## 2024-09-26 RX ORDER — SODIUM CHLORIDE 0.9 % (FLUSH) 0.9 %
10 SYRINGE (ML) INJECTION PRN
Status: DISCONTINUED | OUTPATIENT
Start: 2024-09-26 | End: 2024-09-29 | Stop reason: HOSPADM

## 2024-09-26 RX ORDER — IOPAMIDOL 755 MG/ML
75 INJECTION, SOLUTION INTRAVASCULAR
Status: COMPLETED | OUTPATIENT
Start: 2024-09-26 | End: 2024-09-26

## 2024-09-26 RX ADMIN — Medication 10 ML: at 18:51

## 2024-09-26 RX ADMIN — IOPAMIDOL 75 ML: 755 INJECTION, SOLUTION INTRAVENOUS at 18:49

## 2024-09-27 LAB
HAV IGM SER IA-ACNC: NONREACTIVE
HEP B S AGB SURF AG: NONREACTIVE
HEPATITIS B CORE IGM ANTIBODY: NONREACTIVE
HEPATITIS C ANTIBODY: NONREACTIVE
HIV AG/AB: NONREACTIVE
RPR: NONREACTIVE

## 2024-11-29 ENCOUNTER — HOSPITAL ENCOUNTER (OUTPATIENT)
Age: 24
Discharge: HOME | End: 2024-11-29
Payer: COMMERCIAL

## 2024-11-29 LAB
ALBUMIN SERPL-MCNC: 4.5 G/DL (ref 3.5–5.2)
ALP SERPL-CCNC: 125 U/L (ref 40–129)
ALT SERPL-CCNC: 23 U/L (ref 0–40)
ANION GAP SERPL CALCULATED.3IONS-SCNC: 11 MMOL/L (ref 7–16)
AST SERPL-CCNC: 30 U/L (ref 0–39)
BASOPHILS # BLD: 0.04 K/UL (ref 0–0.2)
BASOPHILS NFR BLD: 1 % (ref 0–2)
BILIRUB SERPL-MCNC: 0.2 MG/DL (ref 0–1.2)
BUN SERPL-MCNC: 17 MG/DL (ref 6–20)
CALCIUM SERPL-MCNC: 9.6 MG/DL (ref 8.6–10.2)
CHLORIDE SERPL-SCNC: 106 MMOL/L (ref 98–107)
CO2 SERPL-SCNC: 25 MMOL/L (ref 22–29)
CREAT SERPL-MCNC: 1 MG/DL (ref 0.7–1.2)
EOSINOPHIL # BLD: 0.18 K/UL (ref 0.05–0.5)
EOSINOPHILS RELATIVE PERCENT: 3 % (ref 0–6)
ERYTHROCYTE [DISTWIDTH] IN BLOOD BY AUTOMATED COUNT: 12.2 % (ref 11.5–15)
FERRITIN SERPL-MCNC: 51 NG/ML
GFR, ESTIMATED: >90 ML/MIN/1.73M2
GLUCOSE SERPL-MCNC: 88 MG/DL (ref 74–99)
HCT VFR BLD AUTO: 37 % (ref 37–54)
HGB BLD-MCNC: 12.4 G/DL (ref 12.5–16.5)
IMM GRANULOCYTES # BLD AUTO: <0.03 K/UL (ref 0–0.58)
IMM GRANULOCYTES NFR BLD: 0 % (ref 0–5)
IRON SATN MFR SERPL: 17 % (ref 20–55)
IRON SERPL-MCNC: 61 UG/DL (ref 59–158)
LYMPHOCYTES NFR BLD: 2.32 K/UL (ref 1.5–4)
LYMPHOCYTES RELATIVE PERCENT: 32 % (ref 20–42)
MCH RBC QN AUTO: 28.7 PG (ref 26–35)
MCHC RBC AUTO-ENTMCNC: 33.5 G/DL (ref 32–34.5)
MCV RBC AUTO: 85.6 FL (ref 80–99.9)
MONOCYTES NFR BLD: 0.74 K/UL (ref 0.1–0.95)
MONOCYTES NFR BLD: 10 % (ref 2–12)
NEUTROPHILS NFR BLD: 55 % (ref 43–80)
NEUTS SEG NFR BLD: 4.04 K/UL (ref 1.8–7.3)
PLATELET # BLD AUTO: 234 K/UL (ref 130–450)
PMV BLD AUTO: 11.2 FL (ref 7–12)
POTASSIUM SERPL-SCNC: 4.1 MMOL/L (ref 3.5–5)
PROT SERPL-MCNC: 6.8 G/DL (ref 6.4–8.3)
RBC # BLD AUTO: 4.32 M/UL (ref 3.8–5.8)
SODIUM SERPL-SCNC: 142 MMOL/L (ref 132–146)
TIBC SERPL-MCNC: 353 UG/DL (ref 250–450)
VALPROATE SERPL-MCNC: 57 UG/ML (ref 50–100)
WBC OTHER # BLD: 7.3 K/UL (ref 4.5–11.5)

## 2024-11-29 PROCEDURE — 82728 ASSAY OF FERRITIN: CPT

## 2024-11-29 PROCEDURE — 85025 COMPLETE CBC W/AUTO DIFF WBC: CPT

## 2024-11-29 PROCEDURE — 80053 COMPREHEN METABOLIC PANEL: CPT

## 2024-11-29 PROCEDURE — 83540 ASSAY OF IRON: CPT

## 2024-11-29 PROCEDURE — 80164 ASSAY DIPROPYLACETIC ACD TOT: CPT

## 2024-11-29 PROCEDURE — 36415 COLL VENOUS BLD VENIPUNCTURE: CPT

## 2024-11-29 PROCEDURE — 83550 IRON BINDING TEST: CPT

## 2025-07-16 ENCOUNTER — OFFICE VISIT (OUTPATIENT)
Dept: PRIMARY CARE CLINIC | Age: 25
End: 2025-07-16
Payer: COMMERCIAL

## 2025-07-16 VITALS
DIASTOLIC BLOOD PRESSURE: 81 MMHG | RESPIRATION RATE: 18 BRPM | OXYGEN SATURATION: 100 % | WEIGHT: 230 LBS | HEIGHT: 68 IN | HEART RATE: 74 BPM | SYSTOLIC BLOOD PRESSURE: 121 MMHG | TEMPERATURE: 98 F | BODY MASS INDEX: 34.86 KG/M2

## 2025-07-16 DIAGNOSIS — S90.411A ABRASION, RIGHT GREAT TOE, INITIAL ENCOUNTER: ICD-10-CM

## 2025-07-16 DIAGNOSIS — S99.921A INJURY OF RIGHT FOOT, INITIAL ENCOUNTER: Primary | ICD-10-CM

## 2025-07-16 PROCEDURE — 90715 TDAP VACCINE 7 YRS/> IM: CPT | Performed by: NURSE PRACTITIONER

## 2025-07-16 PROCEDURE — 4004F PT TOBACCO SCREEN RCVD TLK: CPT | Performed by: NURSE PRACTITIONER

## 2025-07-16 PROCEDURE — G8417 CALC BMI ABV UP PARAM F/U: HCPCS | Performed by: NURSE PRACTITIONER

## 2025-07-16 PROCEDURE — 99203 OFFICE O/P NEW LOW 30 MIN: CPT | Performed by: NURSE PRACTITIONER

## 2025-07-16 PROCEDURE — G8427 DOCREV CUR MEDS BY ELIG CLIN: HCPCS | Performed by: NURSE PRACTITIONER

## 2025-07-16 PROCEDURE — 90471 IMMUNIZATION ADMIN: CPT | Performed by: NURSE PRACTITIONER

## 2025-07-16 RX ORDER — PRAZOSIN HYDROCHLORIDE 2 MG/1
CAPSULE ORAL
COMMUNITY
Start: 2024-12-30

## 2025-07-16 RX ORDER — PRENATAL VIT 91/IRON/FOLIC/DHA 28-975-200
COMBINATION PACKAGE (EA) ORAL 2 TIMES DAILY
COMMUNITY
Start: 2025-03-18

## 2025-07-16 NOTE — PROGRESS NOTES
Chief Complaint:   Foot Pain (Patient had a vehicle ramp dropped on his foot about 1 hour ago. )    History of Present Illness   Source of history provided by:  patient.     Micheal Branch is a 24 y.o. male presenting to walk in for traumatic Right foot pain which occured 1 hour(s) prior to arrival.  The complaint is due to a direct blow to the injured area while helping someone move a ramp.  Patient has no prior history of pain/injury with regards to today's visit.  Since onset the symptoms have been persistent with ability to bear weight, but with some pain.  His pain is aggraveated by pressure on or palpation of painful area and relieved by rest of injured area.  He denies any associated injuries. He does have a small abrasion noted to the right side of his toenail. No injury to the toenail itself. Tetanus Status: unknown.      Review of Systems   Unless otherwise stated in this report or unable to obtain because of the patient's clinical or mental status as evidenced by the medical record, this patients's positive and negative responses for Review of Systems, constitutional, psych, eyes, ENT, cardiovascular, respiratory, gastrointestinal, neurological, genitourinary, musculoskeletal, integument systems and systems related to the presenting problem are either stated in the preceding or were negative for the symptoms and/or complaints related to the medical problem.    Past Medical History:  has a past medical history of ADHD, Depression, Intussusception (HCC), and Oppositional defiant disorder.  Past Surgical History:  has a past surgical history that includes Abdomen surgery.  Social History:  reports that he has been smoking cigarettes. He has never used smokeless tobacco. He reports current drug use. He reports that he does not drink alcohol.  Family History: family history is not on file.   Allergies: Patient has no known allergies.    Physical Exam   Vital Signs: /81   Pulse 74   Temp 98 °F (36.7